# Patient Record
Sex: FEMALE | Race: WHITE | NOT HISPANIC OR LATINO | Employment: FULL TIME | URBAN - METROPOLITAN AREA
[De-identification: names, ages, dates, MRNs, and addresses within clinical notes are randomized per-mention and may not be internally consistent; named-entity substitution may affect disease eponyms.]

---

## 2020-07-29 ENCOUNTER — ALLIED HEALTH/NURSE VISIT (OUTPATIENT)
Dept: SURGERY | Facility: CLINIC | Age: 25
End: 2020-07-29

## 2020-07-29 ENCOUNTER — VIRTUAL VISIT (OUTPATIENT)
Dept: SURGERY | Facility: CLINIC | Age: 25
End: 2020-07-29
Payer: COMMERCIAL

## 2020-07-29 VITALS — BODY MASS INDEX: 33.6 KG/M2 | WEIGHT: 240 LBS | HEIGHT: 71 IN

## 2020-07-29 DIAGNOSIS — E66.811 CLASS 1 OBESITY DUE TO EXCESS CALORIES WITH SERIOUS COMORBIDITY AND BODY MASS INDEX (BMI) OF 33.0 TO 33.9 IN ADULT: Primary | ICD-10-CM

## 2020-07-29 DIAGNOSIS — M25.561 CHRONIC PAIN OF RIGHT KNEE: ICD-10-CM

## 2020-07-29 DIAGNOSIS — G89.29 CHRONIC PAIN OF RIGHT KNEE: ICD-10-CM

## 2020-07-29 DIAGNOSIS — M54.50 LUMBAR BACK PAIN: ICD-10-CM

## 2020-07-29 DIAGNOSIS — E66.09 CLASS 1 OBESITY DUE TO EXCESS CALORIES WITH SERIOUS COMORBIDITY AND BODY MASS INDEX (BMI) OF 33.0 TO 33.9 IN ADULT: Primary | ICD-10-CM

## 2020-07-29 DIAGNOSIS — E66.9 OBESITY (BMI 30-39.9): Primary | ICD-10-CM

## 2020-07-29 PROCEDURE — 99207 ZZC MEDICAL WEIGHT MANAGEMENT MAINTENANCE: CPT

## 2020-07-29 PROCEDURE — 99203 OFFICE O/P NEW LOW 30 MIN: CPT | Mod: 95 | Performed by: PHYSICIAN ASSISTANT

## 2020-07-29 ASSESSMENT — MIFFLIN-ST. JEOR: SCORE: 1929.76

## 2020-07-29 NOTE — PROGRESS NOTES
"Adelso Portillo is a 25 year old female who is being evaluated via a billable video visit.      The patient has been notified of following:     \"This video visit will be conducted via a call between you and your physician/provider. We have found that certain health care needs can be provided without the need for an in-person physical exam.  This service lets us provide the care you need with a video conversation.  If a prescription is necessary we can send it directly to your pharmacy.  If lab work is needed we can place an order for that and you can then stop by our lab to have the test done at a later time.    Video visits are billed at different rates depending on your insurance coverage.  Please reach out to your insurance provider with any questions.    If during the course of the call the physician/provider feels a video visit is not appropriate, you will not be charged for this service.\"    Patient has given verbal consent for Video visit? Yes  How would you like to obtain your AVS? MyChart  If you are dropped from the video visit, the video invite should be resent to: Text to cell phone: [phone]  Will anyone else be joining your video visit? No          Video-Visit Details    Type of service:  Video Visit    Video Start Time: 11:11  Video End Time: 11:47    Originating Location (pt. Location): Home    Distant Location (provider location):  [facility]     Platform used for Video Visit: [employer] Medical Weight Management Consult    PATIENT:  Adelso Portillo  MRN:         [id number]  :         1995  SUNSHINE:         2020    Dear To Whom it May Concern,     I had the pleasure of seeing your patient, Adelso Portillo. Full intake/assessment was done to determine barriers to weight loss success and develop a treatment plan. Adelso Portillo is a 25 year old female interested in treatment of medical problems associated with excess weight. She has a height of " "5' 11\"[Pt reported[, a weight of 240 lbs 0 oz, and the calculated Body mass index is 33.47 kg/m .      ASSESSMENT/PLAN:  About 5 years ago after college noticed a significant weight gain.  There has been slow weight loss and she is down about 25 lbs.  She was working around food all day but switched jobs and feels this has been helpful with her weight loss.  Has some depression and recently started seeing a therapist.  Is interested in starting the 24 week program.  She is not interested in weight loss medications today as sh wants to work on lifestyle first.  She might change her mind later. Recent labs were reviewed.        Goal:  Start 24 week program   Twice weekly will walk for at least 20 minutes      She has the following co-morbidities:       7/24/2020   I have the following health issues associated with obesity: None of the above   I have the following symptoms associated with obesity: Knee Pain, Depression, Back Pain       Patient Goals 7/24/2020   I am interested in having a healthier weight to diminish current health problems: Yes   I am interested in having a healthier weight in order to prevent future health problems: Yes   I am interested in having a healthier weight in order to have a future surgery: No       Referring Provider 7/24/2020   Please name the provider who referred you to Medical Weight Management.  If you do not know, please answer: \"I Don't Know\". I do not know       Weight History 7/24/2020   How concerned are you about your weight? Somewhat Concerned   Would you describe your weight gain as gradual? Yes   I became overweight: As a Teenager   The following factors have contributed to my weight gain:  Mental Health Issues, Lack of Exercise, Genetic (Runs in the Family)   I have tried the following methods to lose weight: Watching Portions or Calories   My lowest weight since age 18 was: 230   My highest weight since age 18 was: 275   The most weight I have ever lost was: (lbs) 30   I " have the following family history of obesity/being overweight:  Many of my relatives are overweight   Has anyone in your family had weight loss surgery? No   How has your weight changed over the last year?  Lost   How many pounds? 1 pound or so     Works 3 pm - midnight      Diet Recall Review with Patient 7/24/2020   Do you typically eat breakfast? Yes    10-10:30 after getting up around 9:30 am   If you do eat breakfast, what types of food do you eat? Mainly oatmeal or something light if i do not really feel hungry. If I have time or are hungry I'll make eggs with veggies and eat an english muffin.  Today did not feel hungry and did not eat. This does not happen much   Do you typically eat lunch? Yes around 1-2 pm   If you do eat lunch, what types of food do you typically eat?  Yesterday had a spicy chicken sandwich from [employer]. Leftovers, sandwiches, it depends.   Do you typically eat supper? Yes  Typically around 6 pm. Yesterday had baked potato with broccoli and cheese. The other night had pasta with marinara and cheese   If you do eat supper, what types of food do you typically eat? Soups, baked fish or chicken, vegetables   Do you typically eat snacks? Yes   If you do snack, what types of food do you typically eat? Chocolate, nuts, fruit, sometimes crackers   Do you like vegetables?  Yes   Do you drink water? Yes    Drinks around 48 oz   How many glasses of juice do you drink in a typical day? 0   How many of glasses of milk do you drink in a typical day? 0   If you do drink milk, what type? N/A   How many 8oz glasses of sugar containing drinks such as Stu-Aid/sweet tea do you drink in a day? 2         Coffee from [employer] or tea with honey   How many cans/bottles of sugar pop/soda/tea/sports drinks do you drink in a day? 0   How many cans/bottles of diet pop/soda/tea or sports drink do you drink in a day? 0   How often do you have a drink of alcohol? 2-4 Times a Month   If you do drink, how many drinks might  you have in a day? 1 or 2       Eating Habits 7/24/2020   Generally, my meals include foods like these: bread, pasta, rice, potatoes, corn, crackers, sweet dessert, pop, or juice. A Few Times a Week   Generally, my meals include foods like these: fried meats, brats, burgers, french fries, pizza, cheese, chips, or ice cream. Once a Week   Eat fast food (like [employer], [employer], [employer]aco [employer]). Never   Eat at a buffet or sit-down restaurant. Never   Eat most of my meals in front of the TV or computer. A Few Times a Week   Often skip meals, eat at random times, have no regular eating times. A Few Times a Week   Rarely sit down for a meal but snack or graze throughout.  A Few Times a Week   Eat extra snacks between meals. Almost Everyday   Eat most of my food at the end of the day. Less Than Weekly   Eat in the middle of the night or wake up at night to eat. Once a Week only because she is up late   Eat extra snacks to prevent or correct low blood sugar. Never   Eat to prevent acid reflux or stomach pain. Once a Week   Worry about not having enough food to eat. Never   Have you been to the food shelf at least a few times this year? No   I eat when I am depressed. Once a Week   I eat when I am stressed. A Few Times a Week   I eat when I am bored. A Few Times a Week   I eat when I am anxious. Once a Week   I eat when I am happy or as a reward. A Few Times a Week   I feel hungry all the time even if I just have eaten. Never   I finish all the food on my plate even if I am already full. A Few Times a Week   I can't resist eating delicious food or walk past the good food/smell. Less Than Weekly   I eat/snack without noticing that I am eating. Once a Week   I eat when I am preparing the meal. A Few Times a Week   I eat more than usual when I see others eating. Never   I have trouble not eating sweets, ice cream, cookies, or chips if they are around the house. A Few Times a Week   I think about food all day. Never   What  foods, if any, do you crave? Sweets/Candy/Chocolate   Please list any other foods you crave? Cravings kind of depend. I crave sweets sometimes, and saltier foods other times. Sometimes I try not to buy my specific craving because I know I'll just eat it all in one sitting       Amount of Food 7/24/2020   I make myself vomit what I have eaten or use laxatives to get rid of food. Never   I eat a large amount of food, like a loaf of bread, a box of cookies, a pint/quart of ice cream, all at once. Weekly   This is mainly after grocery shopping   I eat a large amount of food even when I am not hungry. Monthly   I eat rapidly. Almost Everyday   I eat alone because I feel embarrassed and do not want others to see how much I have eaten. Never   I eat until I am uncomfortably full. Monthly   I feel bad, disgusted, or guilty after I overeat. Weekly   I make myself vomit what I have eaten or use laxatives to get rid of food. Never       Activity/Exercise History 7/24/2020   How much of a typical 12 hour day do you spend sitting? Most of the Day   How much of a typical 12 hour day do you spend lying down? Less Than Half the Day   How much of a typical day do you spend walking/standing? Half the Day   How many hours (not including work) do you spend on the TV/Video Games/Computer/Tablet/Phone? 6 Hours or More   How many times a week are you active for the purpose of exercise? Once a Week   What keeps you from being more active? Lack of Time, Too tired, Worried People Will Look At Me   How many total minutes do you spend doing some activity for the purpose of exercising when you exercise? 15-30 Minutes       PAST MEDICAL HISTORY:  Past Medical History:   Diagnosis Date     Depressive disorder        Work/Social History Reviewed With Patient 7/24/2020   My employment status is: Full-Time   My job is: Evening shift MLT at [employer]   How much of your job is spent on the computer or phone? 75%   How many hours do  you spend commuting to work daily?  0.5 hours   What is your marital status? Single   If in a relationship, is your significant other overweight? N/A   Do you have children? No   If you have children, are they overweight? N/A   Who do you live with?  One of my sisters, she is 2 years younger and does not know about my health goals.   Are they supportive of your health goals? No   Who does the food shopping?  Me       Mental Health History Reviewed With Patient 7/24/2020   Have you ever been physically or sexually abused? No   If yes, do you feel that the abuse is affecting your weight? N/A   If yes, would you like to talk to a counselor about the abuse? N/A   How often in the past 2 weeks have you felt little interest or pleasure in doing things? For Several Days   Over the past 2 weeks how often have you felt down, depressed, or hopeless? For Several Days       Sleep History Reviewed With Patient 7/24/2020   How many hours do you sleep at night? 8   Do you think that you snore loudly or has anybody ever heard you snore loudly (louder than talking or so loud it can be heard behind a shut door)? No   Has anyone seen or heard you stop breathing during your sleep? No   Do you often feel tired, fatigued, or sleepy during the day? Yes   Do you have a TV/Computer in your bedroom? Yes       ROS  General  Fatigue: yes  Sleep Quality:ok  HEENT  Hx of glaucoma: No  Vision changes: No  Cardiovascular  Chest Pain with Exertion: No  Palpitations: No  Hx of heart disease: No  Pulmonary  Shortness of breath at rest: No  Shortness of breath with exertion: No  Snoring: No  Gastrointestinal  Heartburn: some   Might take a tums  Abdominal pain: No  Gastrourinary  History of kidney stones: No  Psychiatric  Moods Stable: Yes  History of drug abuse: No  Endocrine  Polydipsia: No  Polyuria: No  Neurologic:  Hx of seizures: No  Hx of paresthesias No  Birth Control:  No      MEDICATIONS:   Current Outpatient Medications   Medication Sig  "Dispense Refill     VITAMIN D PO          ALLERGIES:   No Known Allergies       PHYSICAL EXAM:  Ht 5' 11\" (1.803 m)   Wt 240 lb (108.9 kg)   BMI 33.47 kg/m    General: NAD  Alert and oriented      FOLLOW-UP:   Per 24 week program        Sincerely,    Silvina Troncoso PA-C            "

## 2020-07-29 NOTE — PROGRESS NOTES
RN spoke with pt on the phone following provider visit at which patient signed up for 24 Week Plan and declined [employer] product line.    Discussed with patient the 24 Week Healthy Lifestyle Program, including cost/payment, cooking classes and exercise classes, schedule of visits, journal and healthy habits.    Handouts and cookbook/journal will be mailed to pt.    Patient graduation date is 1/12/2021.    Reviewed contact information for questions or concerns.    Patient will call back to make her appointments.    Phone number for payment given to pt.    Gypsy Matthews RN on 7/29/2020 at 12:10 PM

## 2020-07-29 NOTE — PATIENT INSTRUCTIONS
Goal:  Start 24 week program   Twice weekly will walk for at least 20 minutes        Eat Better ? Move More ? Live Well    Eat 3 nutrient-rich meals each day     Don t skip meals--it will cause you to overeat later in the day!     Eating fiber (vegetables/fruits/whole grains) and protein with meals helps you stay full longer     Choose foods with less than 10 grams of sugar and 5 grams of fat per serving to prevent excess calories and weight re-gain   Eat around the same times each day to develop a routine eating schedule    Avoid snacking unless physically hungry.   Planned snacks: 1-2 times per day and no more than 150 calories    Eat protein first    Protein helps with healing, maintaining adequate muscle mass, reducing hunger and optimizing nutritional status    Aim for 60-80 grams of protein per day   Fill up on Fiber    Fiber comes from plants--fruits, veggies, whole grains, nuts/seeds and beans    Fiber is low in calories, high in phytonutrients and helps you stay full longer    Aim for 25-35 grams per day by eating fiber with meals and snacks  Eat [id number]    Take 20-30 minutes to eat each meal by taking small bites, chewing foods to applesauce consistency or 20-30 times before you swallow    Eating foods too fast can delay satiety/fullness signals and increase overeating   ? Slow down your eating by using toddler utensils, putting your fork/spoon down between bites and not watching TV or emailing during meals!   Keep a Journal          Writing down what you eat, how you feel and when you are active helps you identify new changes to work on from week to week          Look for ways to cut 100 calories from your current diet 2-3 times per day  Drink 64 ounces of 0-Calorie drinks between meals    Water    Zero calorie [employer]  or Vitamin Water      [employer]  Zero Calories    Crystal Light , Sugar-Free Stu-Aid , and other sugar-free lemonade or flavored braswell    Keep Caffeine to less than 300mg per day  ie: 3-6oz cups coffee     Work up to 45-60 minutes of physical activity most days of the week    Helps with losing weight and prevent regaining those extra pounds!     Do a combo of cardio (walking/water exercises) and strength training (lifting weights/Vinyasa yoga)    Avoid Mindless Eating    Be present when you eat--take note of the smell, taste and quality of your food    Make a list of alternative activities you could do to prevent eating out of boredom/stress  ? Go for a walk, call a friend, chew gum, paint your nails, re-organize the garage, etc

## 2020-07-30 ENCOUNTER — ALLIED HEALTH/NURSE VISIT (OUTPATIENT)
Dept: SURGERY | Facility: CLINIC | Age: 25
End: 2020-07-30

## 2020-07-30 DIAGNOSIS — E66.9 OBESITY (BMI 30-39.9): Primary | ICD-10-CM

## 2020-07-30 PROCEDURE — 99207: CPT

## 2020-07-30 NOTE — PROGRESS NOTES
Encounter placed to collect payment for 24 Week HLP plan.  Pt was not seen in clinic.  Gypsy Matthews RN on 7/30/2020 at 3:21 PM

## 2020-07-30 NOTE — PROGRESS NOTES
Patient handouts, cookbook, and journal mailed to patient.  Awaiting payment call.  Gypsy Matthews RN on 7/30/2020 at 2:21 PM

## 2020-08-03 ENCOUNTER — VIRTUAL VISIT (OUTPATIENT)
Dept: SURGERY | Facility: CLINIC | Age: 25
End: 2020-08-03
Payer: COMMERCIAL

## 2020-08-03 DIAGNOSIS — E66.9 OBESE: Primary | ICD-10-CM

## 2020-08-03 DIAGNOSIS — E66.09 CLASS 1 OBESITY DUE TO EXCESS CALORIES WITH SERIOUS COMORBIDITY AND BODY MASS INDEX (BMI) OF 33.0 TO 33.9 IN ADULT: ICD-10-CM

## 2020-08-03 DIAGNOSIS — E66.811 CLASS 1 OBESITY DUE TO EXCESS CALORIES WITH SERIOUS COMORBIDITY AND BODY MASS INDEX (BMI) OF 33.0 TO 33.9 IN ADULT: ICD-10-CM

## 2020-08-03 PROCEDURE — 97802 MEDICAL NUTRITION INDIV IN: CPT | Mod: GT | Performed by: DIETITIAN, REGISTERED

## 2020-08-03 PROCEDURE — 99207 ZZC MEDICAL WEIGHT MANAGEMENT MAINTENANCE: CPT | Performed by: COMMUNITY HEALTH WORKER

## 2020-08-03 NOTE — PROGRESS NOTES
"[facility]    Progress Notes    New Weight Management Health Coaching Note    Adelso Portillo          MRN: [id number]  : 1995  SUNSHINE: August 3, 2020    Health  Visit #: 1  Visit Type:  24 week   Telephone Visit - Call Mobile: [phone] - listed as her home also mobile?     Initial Start Date: 2020  Graduation Date: 2020     Provider: Silvina Troncoso, Obesity Class 1 (BMI 30 to 34.9).  Initial Height and Wt: 5' 11 , 240 lbs   BMI = 33.47 kg/m     Chart Review Notes.  Wt Loss Prior to Starting Program: 25 lbs  Adult Wt Range: 230 to 275, Most wt loss: 30 lb; Wt change in last year: Lost 1 lb  Became Overwt as 5 yrs after college ()  Other Pillar Notes: Knee Pain, Depression (seeing therapist), Back Pain   [place] about program?     Provider PLAN.    \"Start 24 week program   Twice weekly will walk for at least 20 minutes\"     ASSESSMENT.  Current Weight: 240 lbs still -any weigh-ins since ?  Average Daily Water: 48 oz + [place] Coffee or tea w/honey  1-2 alcohol drinks 2-4 x/month  Weight Loss Medication: None at this time  Nutrition Plan: Other: Regular Diet    Additional Program Support.  Payment: SHIRLENE mayer, awaiting as of   Pillar Assessment Completed on: Initial 8/3/20, Mid-Program, #12 Visit  Estore: n/a  Received cookbook on - Patient handouts, cookbook, and journal mailed on 20  W2W Discussions: 8/3 + fabiano sent  Cooking Class Series: f/u on   EM Class:   Support Group: ?    INITIAL INTAKE:  The four pillars of well-being may impact your ability to manage weight.    Initial Level of Satisfaction completed on 8/3: All rated on a scale of 1-10.    Sleep: 6    Movement: 3    Nutrition: 6    Stress Management/Emotional Well-bein    Sleep: Would like to be at a 9  Movement: Would like to be at a 7  Nutrition: Would like to be at a 9  Stress Management/Emotional Wellbeing: Would like to be at a 8-9    Which of the pillars are you wanting to focus on " "first and why?    Adelso's initial focus is on sleep, nutrition & movement  pillars.  Sleep: Establishing a more consistent bedtime. Her position is strictly evenings which means she works 3 pm to midnight (with only random overnights) when team needs so no barriers presented today on establishing this.  Movement: f/u with at future visits  Nutrition: Per RD recommendations so far, Eating breakfast within an hour of waking up and increasing consistency with meal planning and prepping.  She feels confident in creating healthy meals aligned with My[employer] Method yet open to adding to her tools her. Limited to for discussion here. Possible adding in new recipes to prevent boredom?    Initial RD Goals set on 8/3 with Syl. Directly prior to her HC visit today.    Action Steps at Today's HC Visit.  (Both below are per her RD visit recommendations on 8/3.)  1) Improve Breakfast, including protein and fiber in breakfast meal & aim to eat within an hour after waking. E.g adding nuts to her oatmeal + honey. (Currently eating 2+ hrs after waking up and only will have protein or fiber.)  2) Plan and prep in advance for lunch and dinner to reduce convenient meals.   3) If time allows check MyChart on Program Info + W2W classes    See above for \"beginning\" of creating her Wellness Intentions.    Next Visit: F/u with meal planning & prepping discussion - SG + W2W Classes - [place] about program?   Future: Co-creating Wellness Growth Plan. Journaling -Tracking - Allocated Reflection Time     PATIENT INFORMATION PROVIDED:  - 24 Week Healthy Lifestyle Plan Overview Handout:  o Explained the structure of the 24 week plan  o Reviewed scheduling information  o Discussed option to do coaching sessions via phone or in person  - Cooking and Exercise Class Handout  - Support Group  - Explain the role of a HEALTH  and the FOUR Pillars of Health  - Contact Info (HC & Program), W2W Class Dates + Scale Info  - Explained MyChart " Process  - Discussed 'Wellness Plan' Concept  - [place] Discussion & Setup - n/a  - Reviewed program and made an action plan for pt to complete all his HC visits within 24 wks.     HC Visit #1 8/3, Check-in Program F/u on , #2 8/10, #3     Confirm Other 24 Wk HLP Visits. No f/u Rd or Provider: Pt will schedule.     NOTES: Adelso is 24 yo. 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 Lives w/sis 2 yrs younger than her - hasn't shared health intentions w/her yet.  - 1995    FOLLOW-UP: Scheduled in office, reminder to switch to phone or reschedule by calling [phone].     TIME: 30 minutes spent with patient, including charting time.     SIGNATURE:  Caterina Barlow, Certified Health  on 8/3/2020 at 7:45 AM

## 2020-08-03 NOTE — PROGRESS NOTES
"Adelso Portillo is a 25 year old female who is being evaluated via a billable video visit.      The patient has been notified of following:     \"This video visit will be conducted via a call between you and your physician/provider. We have found that certain health care needs can be provided without the need for an in-person physical exam.  This service lets us provide the care you need with a video conversation.  If a prescription is necessary we can send it directly to your pharmacy.  If lab work is needed we can place an order for that and you can then stop by our lab to have the test done at a later time.    Video visits are billed at different rates depending on your insurance coverage.  Please reach out to your insurance provider with any questions.    If during the course of the call the physician/provider feels a video visit is not appropriate, you will not be charged for this service.\"    Patient has given verbal consent for Video visit? Yes  How would you like to obtain your AVS? MyChart  Will anyone else be joining your video visit? No        Video-Visit Details    Type of service:  Video Visit    Video Start Time: 10:00am  Video End Time: 10:30am    Originating Location (pt. Location): Home    Distant Location (provider location):  Provider Remote Office    Platform used for Video Visit: [facility] WEIGHT LOSS INITIAL EVALUATION - 24w Healthy Lifestyle Program  DIAGNOSIS:  Obesity Class I    NUTRITION HISTORY:  Diet recall per PA-C visit on 7/29/2020 as follows:  Diet Recall Review with Patient 7/24/2020   Do you typically eat breakfast? Yes    10-10:30 after getting up around 9:30 am   If you do eat breakfast, what types of food do you eat? Mainly oatmeal or something light if i do not really feel hungry. If I have time or are hungry I'll make eggs with veggies and eat an english muffin.  Today did not feel hungry and did not eat. This does not happen much   Do you typically eat lunch? Yes around " "1-2 pm   If you do eat lunch, what types of food do you typically eat?  Yesterday had a spicy chicken sandwich from [employer]. Leftovers, sandwiches, it depends.   Do you typically eat supper? Yes  Typically around 6 pm. Yesterday had baked potato with broccoli and cheese. The other night had pasta with marinara and cheese   If you do eat supper, what types of food do you typically eat? Soups, baked fish or chicken, vegetables   Do you typically eat snacks? Yes   If you do snack, what types of food do you typically eat? Chocolate, nuts, fruit, sometimes crackers   Do you like vegetables?  Yes   Do you drink water? Yes    Drinks around 48 oz   How many glasses of juice do you drink in a typical day? 0   How many of glasses of milk do you drink in a typical day? 0   If you do drink milk, what type? N/A   How many 8oz glasses of sugar containing drinks such as St[employer]/sweet tea do you drink in a day? 2         Coffee from [employer] or tea with honey   How many cans/bottles of sugar pop/soda/tea/sports drinks do you drink in a day? 0   How many cans/bottles of diet pop/soda/tea or sports drink do you drink in a day? 0   How often do you have a drink of alcohol? 2-4 Times a Month   If you do drink, how many drinks might you have in a day? 1 or 2     Other: Pt starting 24w program. Opts to work on lifestyle changes before starting medications or using meal replacement product.     ANTHROPOMETRICS:  Height: 5' 11\"   Weight: 240 lbs 0 oz   BMI:  Body mass index is 33.47 kg/m .   NUTRITION DIAGNOSIS:   Obese, class I related to excess energy intake as evidence by BMI of 33.46 kg/m2.   NUTRITION INTERVENTIONS  Nutrition Prescription:  Recommend modified energy- nutrient intake  Implementation:  Nutrition Education (Content):    Discussed portion sizes and plate method    Provided handouts: Tips for Weight Loss and Weight Management, Protein Sources for Weight Loss, Tips for Increasing Fiber, Plate Method    Nutrition Education " (Application):     Patient to practice goals as stated below    Patient verbalizes understanding of diet by stating she will work on eating multiple food groups at meals    Anticipate good compliance    Goals:  Include 2 food groups at breakfast  Plan lunches and dinners ahead of time    FOLLOW UP AND MONITORING:    Other  - follow up per 24w [facility] protocol    Visit Length:   30 minutes     Syl Villavicencio RD, LD  Clinical Dietitian

## 2020-08-04 VITALS — WEIGHT: 240 LBS | HEIGHT: 71 IN | BODY MASS INDEX: 33.6 KG/M2

## 2020-08-04 ASSESSMENT — MIFFLIN-ST. JEOR: SCORE: 1929.76

## 2020-08-07 ENCOUNTER — VIRTUAL VISIT (OUTPATIENT)
Dept: SURGERY | Facility: CLINIC | Age: 25
End: 2020-08-07
Payer: COMMERCIAL

## 2020-08-07 DIAGNOSIS — E66.9 OBESE: Primary | ICD-10-CM

## 2020-08-07 PROCEDURE — 99207 ZZC MWM HEALTH COACH NO CHARGE: CPT | Performed by: COMMUNITY HEALTH WORKER

## 2020-08-07 NOTE — PROGRESS NOTES
"FREDDY SWAN     Progress Notes    Return Weight Management Health Coaching Note    Adelso Portillo          MRN: [id number]  : 1995  SUNSHINE: 2020    Health  Visit #: 2  Type of Visit: 24 week   Telephone Visit - Call Mobile: [phone] - listed as her home also mobile?     Initial Start Date: 2020  Graduation Date: 2020     Provider: Silvina Troncoso, Obesity Class 1 (BMI 30 to 34.9).  Initial Height and Wt: 5' 11 , 240 lbs   BMI = 33.47 kg/m     ASSESSMENT:  HC Visit on 8/3:      Current Weight: none reported  Average Daily Water: 48 oz + [place] Coffee or tea w/honey  1-2 alcohol drinks 2-4 x/month  Weight Loss Medication: None  Nutrition Plan: Other: Regular Diet  Cooking Class: No  Exercise Class: No    Additional Program Support.  DPP Program: ?  Payment: SHIRLENE mayer, awaiting as of   Pillar Assessment Completed on: Initial 8/3/20, Mid-Program, #12 Visit  Estore: n/a  Received cookbook on - Patient handouts, cookbook, and journal mailed on 20  W2W Discussions: 8/3 + mycharts sent,    Cooking Class Series: F/uon 8/10 - ready to set reg goal?    EM Class:   Support Group:  discussed - interest tbd    Reviewed Previous RD Goals from 8/3.  \"Include 2 food groups at breakfast  Plan lunches and dinners ahead of time\"    Goals Set at HC Visit on 8/3. Reviewed w/updates reflected in goals below.     Pillars/Intentions Discussed & Action Steps Set at Today's HC Visit.   Highlights from Past Week's and Today's Visit:  *Depsite her schedule change (workign an overnight shift), Adelso accomplished eating her breakfast 3x during within an hour after waking up.   *Doing great w/other nutrition intentions: making protein/fiber changes with her breakfast & meal prepping in advance. Soup on the stove & roasting larger amounts of meats/veggies have proven helpful for her.   *Already brainstorming what would serve her best with nutrition pillar: keeping a  " "kitchen by not bringing home trigger foods (telling herself \"no\" @ grocery store) and for ice cream purchasing only a small container.     Action Steps pt set until next HC Visit are:  1) Nutrition.  -Improve Breakfast, including protein and fiber in breakfast meal & aim to eat within an hour after waking. E.g adding nuts to her oatmeal + honey. (Currently eating 2+ hrs after waking up and only will have protein or fiber.)  -Meal prepping in advance. Adelso wants to continue to have 2 parts of her meals ready in advance (e.g. protein and veggies).  -Check/try out protein-fortified ice cream options out. (She wants to try this over freezing greek yogurt.)    2)  Schedule next RD visit.    3) Consider when she will want to register for W2W classes & interest in Healthy Lifestyle Support Group.    Next Visit: Capture weigh-in, f/u with #3 above + discussion on Sleep.-Establishing bedtime routine.     NOTES:  - 1995    Wellness Intentions.   Sleep.  -Establishing a more consistent bedtime. Her position is strictly evenings which means she works 3 pm to midnight (with only random overnights) when team needs so no barriers presented today on establishing this.    Nutrition.  -Improve quality of dietary intake. (protein & fiber intake)  -Improve meal timing, specifically eating breakfast earlier. -Plan and prep in advance for lunch and dinner to reduce convenient meals.   3) If time allows check MyChart on Program Info + W2W classes     See above for \"beginning\" of creating her Wellness Intentions.    PATIENT EDUCATION PROVIDED:  Progress Review  OEQ -Lessons [place]/Success Stories to Share   Affirmations / Character Strength Reflection  OEQ -RD, W2W classes, program and scheduling questions,   OEQ - Anything more pt needs to add addt'l value to her visit today?    HC Visit #1 8/3, Check-in Program F/u on , #2 8/10, #3     Confirmed Upcoming 24 Wk HLP provider/rd visits: No f/u Rd or Provider: Pt will " schedule.     FOLLOW-UP: Reminder to switch to phone or reschedule by calling [phone].    TIME: 30 minutes spent with patient, including charting time.   SIGNATURE:  Caterina Barlow, Certified Health  on 8/7/2020 at 10:14 AM

## 2020-08-10 ENCOUNTER — VIRTUAL VISIT (OUTPATIENT)
Dept: SURGERY | Facility: CLINIC | Age: 25
End: 2020-08-10
Payer: COMMERCIAL

## 2020-08-10 DIAGNOSIS — E66.9 OBESE: Primary | ICD-10-CM

## 2020-08-10 PROCEDURE — 99207 ZZC MWM HEALTH COACH NO CHARGE: CPT | Performed by: COMMUNITY HEALTH WORKER

## 2020-08-10 NOTE — PROGRESS NOTES
"FREDDY SWAN     Progress Notes    Return Weight Management Health Coaching Note    Adelso Portillo          MRN: [id number]  : 1995  SUNSHINE: August 10, 2020    Health  Visit #: 3 & 4  Type of Visit: 24 week   Telephone Visit - Call Mobile: [phone] - listed as her home also mobile?     Initial Start Date: 2020  Graduation Date: 2020      Provider: Silvina Troncoso, Obesity Class 1 (BMI 30 to 34.9).  Initial Height and Wt: 5' 11 , 240 lbs   BMI = 33.47 kg/m     ASSESSMENT:  HC Visit on 8/3: 240 lbs (self-reported)    Current Weight: none reported  Average Daily Water: 48 oz + [place] Coffee or tea w/honey  1-2 alcohol drinks 2-4 x/month  Weight Loss Medication: None  Nutrition Plan: Other: regular diet  Cooking Class: No  Exercise Class: No    Additional Program Support.  DPP Program: ?  Payment: SHIRLENE mayer, awaiting as of   Pillar Assessment Completed on: Initial 8/3/20, Mid-Program, #12 Visit  Estore: n/a  Received cookbook on - Patient handouts, cookbook, and journal mailed on 20  W2W Discussions: 8/3 + mycharts sent,    Cooking Class Series: F/u on 8/10 - ready to set reg goal?  EM Class:   Support Group:  discussed - interest tbd    Reviewed Previous RD Goals from 8/3.  \"Include 2 food groups at breakfast  Plan lunches and dinners ahead of time\"    Goals Set at HC Visit on . Reviewed w/updates reflected in goals below.     Pillars/Intentions Discussed & Action Steps Set at Today's HC Visit.   Highlights from Past Week's and Today's Visit:  *Awareness gained: Walking outside with a mask doesn't feel doable for her. She understands she can seek her PCP guidance about mask wearing outside yet at this time she prefers to add in movement that she wont' \"talk herself out of\".   *Her meal prepping & planning strategies continue to be doable and successful. Prepped rice, ground beef and cut up veggies for ease to put together lunches that meet her nutritional " intentions.     Action Steps pt set until next HC Visit are:  1) Nutrition.  -Improve Breakfast, including protein and fiber in breakfast meal & aim to eat within an hour after waking. E.g adding nuts to her oatmeal + honey. (Currently eating 2+ hrs after waking up and only will have protein or fiber.)  -Meal prepping in advance. Adelso wants to continue to have 2 parts of her meals ready in advance (e.g. protein and veggies).  -Check/try out protein-fortified ice cream options out. (She wants to try this over freezing greek yogurt.)  -Keeping her meal prep simple & adding adding in the variety she needs = important to her.     2) Movement.   -Continue with 10-15 minute walks on her 2nd break.   -On her days off (like today & weekend ahead), she wants to add in yoga. She is curious about checking out the following youtube channel: Yoga w/[employer] & [employer]ya [employer].   -Take the Interview is another channel she wants to have as a back up option to explore for variety in the future.       3) Consider when she will want to register for W2W classes & interest in Healthy Lifestyle Support Group.    4) Schedule next RD visit.    Next Visit: capture weigh-in & water intake -  f/u with #3 above + discussion on Sleep.-Establishing bedtime routine.     Wellness Intentions.   Sleep.  -Establishing a more consistent bedtime. Her position is strictly evenings which means she works 3 pm to midnight (with only random overnights) when team needs so no barriers presented today on establishing this.    Nutrition.  -Improve quality of dietary intake. (protein & fiber intake)  -Improve meal timing, specifically eating breakfast earlier. -Plan and prep in advance for lunch and dinner to reduce convenient meals.     Movement.  -Intentions to increase & create a balance regimen.     PATIENT EDUCATION PROVIDED:  Progress Review  OEQ -Lessons [place]/Success Stories to Share   Affirmations / Character Strength Reflection  OEQ - Nutrition intentions, meal  prepping and planning strategies, w2w classes, sg, movement history +  intentions  OEQ - Anything more pt needs to add addt'l value to her visit today?    HC Visit #1 8/3, Check-in Program F/u on 8/7, #2 8/10, #3 8/17, #4 8/31, #6 9/9     Confirmed Upcoming 24 Wk HLP provider/rd visits: No f/u Rd or Provider: Pt will schedule.     FOLLOW-UP: Reminder to switch to phone or reschedule by calling [phone].    TIME: 30 minutes spent with patient, including charting time.     SIGNATURE:  Caterina Barlow, Certified Health  on 8/10/2020 at 10:42 AM

## 2020-08-17 ENCOUNTER — VIRTUAL VISIT (OUTPATIENT)
Dept: SURGERY | Facility: CLINIC | Age: 25
End: 2020-08-17
Payer: COMMERCIAL

## 2020-08-17 DIAGNOSIS — E66.9 OBESE: Primary | ICD-10-CM

## 2020-08-17 PROCEDURE — 99207 ZZC MWM HEALTH COACH NO CHARGE: CPT | Performed by: COMMUNITY HEALTH WORKER

## 2020-08-17 NOTE — PROGRESS NOTES
"FREDDY SWAN     Progress Notes    Return Weight Management Health Coaching Note    Adelso Portillo          MRN: [id number]  : 1995  SUNSHINE: 2020    Health  Visit #: 5  Type of Visit: 24 week   Telephone Visit - Call Mobile: [phone] - listed as her home also mobile?     Initial Start Date: 2020  Graduation Date: 2020      Provider: Silvina Troncoso, Obesity Class 1 (BMI 30 to 34.9).  Initial Height and Wt: 5' 11 , 240 lbs   BMI = 33.47 kg/m     ASSESSMENT:  HC Visit on 8/3: 240 lbs (self-reported)     Current Weight (lbs): 236 lbs (self-reported) -weigh-in wkly on random days   Average Daily Water (ounces): 48 oz + [place] Coffee or tea w/honey  1-2 alcohol drinks 2-4 x/month  Weight Loss Medication: None  Nutrition Plan: Other: Regular Diet  Cooking Class: No  Exercise Class: No    Additional Program Support.  DPP Program: ?  Payment: SHIRLENE mayer, awaiting as of   Pillar Assessment Completed on: Initial 8/3/20, Mid-Program, #12 Visit  Estore: n/a  Received cookbook on - Patient handouts, cookbook, and journal mailed on 20  W2W Discussions: 8/3 + mycharts sent, , 8/10,   Cooking Class Series: Goal to reg  -plans to do EM class first & enrolling in multiple sessions for both since she works nights - she may ask if they have any day time options that they can \"substitute\".   EM Class:   Support Group:  declining at this time    Reviewed Previous RD Goals from 8/3.  \"Include 2 food groups at breakfast  Plan lunches and dinners ahead of time\"    Goals Set at HC Visit on 8/10. Reviewed w/updates reflected in goals below.     Pillars/Intentions Discussed & Action Steps Set at Today's HC Visit.   Highlights from Past Week's and Today's Visit:  *Tried a new mode of activity: yoga w/ben on youtube - a routine for her lower back. Found it enjoyable, effective & doable to fit into her routine.   *Adelso is feeling great with her progress in the " nutrition & movement pillars. Ready to add to the later...  *Relecting on her movement pillar, she has increased it from random movement (walks) to 4-5 x/wk)    Action Steps pt set until next HC Visit are:  1) Nutrition.  -Improve Breakfast, including protein and fiber in breakfast meal & aim to eat within an hour after waking. E.g adding nuts to her oatmeal + honey. (Currently eating 2+ hrs after waking up and only will have protein or fiber.)  -Meal prepping in advance. Adelso wants to continue to have 2 parts of her meals ready in advance (e.g. protein and veggies).  -Check/try out protein-fortified ice cream options out. (She wants to try this over freezing greek yogurt.)    2) Movement.   -Walking 3-4 x/wk for 10-15 minute walks on her 2nd break.   -1 Yoga routine per wk. (Using Yoga w/[employer]  For now yet has other options to explore: [employer], [employer] etc.)     3) Registering for W2W classes today & will schedule her next RD visit - thinking wk of . If she can't find a time available that wk and schedules wk of , she may opt to move her  hc visit.     Next Visit: f/u with w2w classes & rd visit, do we need to switch her  visit?     NOTES:  - 1995    Wellness Intentions.   Sleep.  -Establishing a more consistent bedtime. Her position is strictly evenings which means she works 3 pm to midnight (with only random overnights) when team needs so no barriers presented today on establishing this.     Nutrition.  -Improve quality of dietary intake. (protein & fiber intake)  -Improve meal timing, specifically eating breakfast earlier. -Plan and prep in advance for lunch and dinner to reduce convenient meals.     Movement.  -Intentions to increase & create a balance regimen.     PATIENT EDUCATION PROVIDED:  Progress Review  OEQ -Lessons [place]/Success Stories to Share   Affirmations / Character Strength Reflection  OEQ - Nutrion & Movement Intentions, Progress of her movement since  "starting program, weigh-in \"schedule\", water intake, w2w & sg  OEQ - Anything more pt needs to add addt'l value to her visit today?    HC Visit #1 8/3, Check-in Program F/u on 8/7, #2 8/10, #3 8/17, #4 8/31, #5 9/9, # 6 9/21    Confirmed Upcoming 24 Wk HLP provider/rd visits: 8/17 plans to schedule next rd visit wk of 9/14 or 9/21    FOLLOW-UP: Reminder to switch to phone or reschedule by calling [phone].    TIME: 30 minutes spent with patient, including charting time.       SIGNATURE:  Caterina Barlow, Certified Health  on 8/17/2020 at 10:36 AM  "

## 2020-08-17 NOTE — Clinical Note
"FYI: pt has goal to schedule her next rd visit.    \"...will schedule her next RD visit - thinking wk of 9/14. If she can't find a time available that wk and schedules wk of 9/21, she may opt to move her 9/21 hc visit.\"  "

## 2020-08-31 ENCOUNTER — TELEPHONE (OUTPATIENT)
Dept: SURGERY | Facility: CLINIC | Age: 25
End: 2020-08-31

## 2020-08-31 NOTE — TELEPHONE ENCOUNTER
Pt Missed Scheduled HC Visit.    Left vm for pt regarding missed Health  visit today, providing pt reschedule info and visit options later this week. Also, confirmed pt next hc visit (by chance she simply wants to wait until her next visit to connect).  -GN

## 2020-09-09 ENCOUNTER — TELEPHONE (OUTPATIENT)
Dept: SURGERY | Facility: CLINIC | Age: 25
End: 2020-09-09

## 2020-09-09 NOTE — TELEPHONE ENCOUNTER
Pt Missed Scheduled HC Visit.    Left vm for pt regarding missed Health  visit today. Provided pt reschedule info including my direct office line, mycharting option, and [employer]'s team phone number.  ALEXSANDRA

## 2020-09-21 ENCOUNTER — TELEPHONE (OUTPATIENT)
Dept: SURGERY | Facility: CLINIC | Age: 25
End: 2020-09-21

## 2020-09-21 NOTE — TELEPHONE ENCOUNTER
Pt Missed Scheduled HC Visit.    Left vm for pt regarding missed Health  visit today. Provided pt reschedule info including my direct office line, mycharting option, and [employer]'s team phone number. Left 1 vm & sent mychart since it was pts 3rd missed hc visit. -GN

## 2020-10-28 ENCOUNTER — TELEPHONE (OUTPATIENT)
Dept: SURGERY | Facility: CLINIC | Age: 25
End: 2020-10-28

## 2021-01-04 ENCOUNTER — HEALTH MAINTENANCE LETTER (OUTPATIENT)
Age: 26
End: 2021-01-04

## 2021-10-10 ENCOUNTER — HEALTH MAINTENANCE LETTER (OUTPATIENT)
Age: 26
End: 2021-10-10

## 2021-10-25 ENCOUNTER — APPOINTMENT (OUTPATIENT)
Dept: URGENT CARE | Facility: CLINIC | Age: 26
End: 2021-10-25
Payer: COMMERCIAL

## 2022-01-30 ENCOUNTER — HEALTH MAINTENANCE LETTER (OUTPATIENT)
Age: 27
End: 2022-01-30

## 2022-09-24 ENCOUNTER — HEALTH MAINTENANCE LETTER (OUTPATIENT)
Age: 27
End: 2022-09-24

## 2022-11-29 ENCOUNTER — TRANSCRIBE ORDERS (OUTPATIENT)
Dept: OTHER | Age: 27
End: 2022-11-29

## 2022-11-29 DIAGNOSIS — M77.9 TENDINITIS: Primary | ICD-10-CM

## 2023-08-05 ENCOUNTER — HEALTH MAINTENANCE LETTER (OUTPATIENT)
Age: 28
End: 2023-08-05

## 2024-04-05 ENCOUNTER — ANCILLARY PROCEDURE (OUTPATIENT)
Dept: GENERAL RADIOLOGY | Facility: CLINIC | Age: 29
End: 2024-04-05
Attending: FAMILY MEDICINE
Payer: COMMERCIAL

## 2024-04-05 ENCOUNTER — OFFICE VISIT (OUTPATIENT)
Dept: URGENT CARE | Facility: URGENT CARE | Age: 29
End: 2024-04-05
Payer: COMMERCIAL

## 2024-04-05 VITALS
WEIGHT: 293 LBS | HEART RATE: 65 BPM | OXYGEN SATURATION: 96 % | RESPIRATION RATE: 16 BRPM | DIASTOLIC BLOOD PRESSURE: 72 MMHG | SYSTOLIC BLOOD PRESSURE: 121 MMHG | TEMPERATURE: 97.7 F | BODY MASS INDEX: 41.14 KG/M2

## 2024-04-05 DIAGNOSIS — M25.572 PAIN IN JOINT, ANKLE AND FOOT, LEFT: ICD-10-CM

## 2024-04-05 DIAGNOSIS — M25.572 PAIN IN JOINT, ANKLE AND FOOT, LEFT: Primary | ICD-10-CM

## 2024-04-05 PROCEDURE — 73610 X-RAY EXAM OF ANKLE: CPT | Mod: TC | Performed by: RADIOLOGY

## 2024-04-05 PROCEDURE — 99213 OFFICE O/P EST LOW 20 MIN: CPT | Performed by: FAMILY MEDICINE

## 2024-04-05 RX ORDER — METHYLPHENIDATE HYDROCHLORIDE 18 MG/1
1 TABLET, EXTENDED RELEASE ORAL EVERY MORNING
COMMUNITY
Start: 2024-03-05 | End: 2024-09-30 | Stop reason: ALTCHOICE

## 2024-04-05 RX ORDER — LEVONORGESTREL 19.5 MG/1
INTRAUTERINE DEVICE INTRAUTERINE
COMMUNITY
Start: 2023-07-27

## 2024-04-05 NOTE — PROGRESS NOTES
Assessment & Plan     Pain in joint, ankle and foot, left [M25.572]  X-ray appears unremarkable will be over read by radiology.  I think her problem is an Achilles tendinitis.  I have demonstrated for her stretches talked about wearing heel lift.  And icing the area                  No follow-ups on file.    Subjective   Adelso is a 28 year old, presenting for the following health issues:  Urgent Care and Ankle Pain (Left ankle pain x 5 days  overcompensating applying more weight on left leg and ankle started to hurt - no known injury - Hx Osteoarthritis on Right knee/)    20-year-old here with left ankle pain.  She points to the posterior ankle over the Achilles tendon insertion and around to the bottom of the heel on the lateral portion of.  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  She has never had ankle problems for the last 3 days it has been hurting especially by the end of the day.  When she walks and pushes off with the foot there is more pain.  She has some issues with her right knee and thinks that it is flared and she has been walking just a little differently recently                       Objective    /72 (BP Location: Left arm, Patient Position: Sitting, Cuff Size: Adult Large)   Pulse 65   Temp 97.7  F (36.5  C) (Tympanic)   Resp 16   Wt 133.8 kg (295 lb)   SpO2 96%   Breastfeeding No   BMI 41.14 kg/m    Body mass index is 41.14 kg/m .  Physical Exam  Vitals and nursing note reviewed.   Constitutional:       Appearance: Normal appearance.   Cardiovascular:      Rate and Rhythm: Normal rate.   Musculoskeletal:      Comments: No obvious deformity but tenderness is over the distal Achilles she has good flexion extension   Neurological:      Mental Status: She is alert.   Psychiatric:         Mood and Affect: Mood normal.         Behavior: Behavior normal.                    Signed Electronically by: Sundeep Rubi MD

## 2024-06-08 ENCOUNTER — HOSPITAL ENCOUNTER (OUTPATIENT)
Facility: CLINIC | Age: 29
Setting detail: OBSERVATION
Discharge: HOME | End: 2024-06-09
Attending: EMERGENCY MEDICINE
Payer: COMMERCIAL

## 2024-06-08 DIAGNOSIS — R45.851 SUICIDAL IDEATION: ICD-10-CM

## 2024-06-08 DIAGNOSIS — F84.0 AUTISM SPECTRUM DISORDER: ICD-10-CM

## 2024-06-08 DIAGNOSIS — F90.0 ATTENTION DEFICIT HYPERACTIVITY DISORDER (ADHD), PREDOMINANTLY INATTENTIVE TYPE: ICD-10-CM

## 2024-06-08 DIAGNOSIS — T50.902A INTENTIONAL DRUG OVERDOSE, INITIAL ENCOUNTER (H): ICD-10-CM

## 2024-06-08 DIAGNOSIS — F33.1 MAJOR DEPRESSIVE DISORDER, RECURRENT EPISODE, MODERATE (H): ICD-10-CM

## 2024-06-08 LAB
ALBUMIN SERPL BCG-MCNC: 4.3 G/DL (ref 3.5–5.2)
ALP SERPL-CCNC: 88 U/L (ref 40–150)
ALT SERPL W P-5'-P-CCNC: 42 U/L (ref 0–50)
AMPHETAMINES UR QL SCN: NORMAL
ANION GAP SERPL CALCULATED.3IONS-SCNC: 15 MMOL/L (ref 7–15)
APAP SERPL-MCNC: 6.2 UG/ML (ref 10–30)
AST SERPL W P-5'-P-CCNC: 29 U/L (ref 0–45)
ATRIAL RATE - MUSE: 77 BPM
BARBITURATES UR QL SCN: NORMAL
BASOPHILS # BLD AUTO: 0 10E3/UL (ref 0–0.2)
BASOPHILS NFR BLD AUTO: 0 %
BENZODIAZ UR QL SCN: NORMAL
BILIRUB SERPL-MCNC: 0.2 MG/DL
BUN SERPL-MCNC: 7.4 MG/DL (ref 6–20)
BZE UR QL SCN: NORMAL
CALCIUM SERPL-MCNC: 9.5 MG/DL (ref 8.6–10)
CANNABINOIDS UR QL SCN: NORMAL
CHLORIDE SERPL-SCNC: 103 MMOL/L (ref 98–107)
CREAT SERPL-MCNC: 0.57 MG/DL (ref 0.51–0.95)
DEPRECATED HCO3 PLAS-SCNC: 21 MMOL/L (ref 22–29)
DIASTOLIC BLOOD PRESSURE - MUSE: NORMAL MMHG
EGFRCR SERPLBLD CKD-EPI 2021: >90 ML/MIN/1.73M2
EOSINOPHIL # BLD AUTO: 0 10E3/UL (ref 0–0.7)
EOSINOPHIL NFR BLD AUTO: 0 %
ERYTHROCYTE [DISTWIDTH] IN BLOOD BY AUTOMATED COUNT: 12.3 % (ref 10–15)
FENTANYL UR QL: NORMAL
GLUCOSE SERPL-MCNC: 169 MG/DL (ref 70–99)
HCT VFR BLD AUTO: 39.7 % (ref 35–47)
HGB BLD-MCNC: 13.5 G/DL (ref 11.7–15.7)
IMM GRANULOCYTES # BLD: 0 10E3/UL
IMM GRANULOCYTES NFR BLD: 0 %
INTERPRETATION ECG - MUSE: NORMAL
LYMPHOCYTES # BLD AUTO: 0.8 10E3/UL (ref 0.8–5.3)
LYMPHOCYTES NFR BLD AUTO: 8 %
MCH RBC QN AUTO: 28.7 PG (ref 26.5–33)
MCHC RBC AUTO-ENTMCNC: 34 G/DL (ref 31.5–36.5)
MCV RBC AUTO: 84 FL (ref 78–100)
MONOCYTES # BLD AUTO: 0.1 10E3/UL (ref 0–1.3)
MONOCYTES NFR BLD AUTO: 1 %
NEUTROPHILS # BLD AUTO: 9.4 10E3/UL (ref 1.6–8.3)
NEUTROPHILS NFR BLD AUTO: 91 %
NRBC # BLD AUTO: 0 10E3/UL
NRBC BLD AUTO-RTO: 0 /100
OPIATES UR QL SCN: NORMAL
P AXIS - MUSE: 82 DEGREES
PCP QUAL URINE (ROCHE): NORMAL
PLATELET # BLD AUTO: 308 10E3/UL (ref 150–450)
POTASSIUM SERPL-SCNC: 3.5 MMOL/L (ref 3.4–5.3)
PR INTERVAL - MUSE: 172 MS
PROT SERPL-MCNC: 8.3 G/DL (ref 6.4–8.3)
QRS DURATION - MUSE: 98 MS
QT - MUSE: 350 MS
QTC - MUSE: 396 MS
R AXIS - MUSE: 43 DEGREES
RBC # BLD AUTO: 4.71 10E6/UL (ref 3.8–5.2)
SALICYLATES SERPL-MCNC: 11.2 MG/DL
SALICYLATES SERPL-MCNC: 15.2 MG/DL
SODIUM SERPL-SCNC: 139 MMOL/L (ref 135–145)
SYSTOLIC BLOOD PRESSURE - MUSE: NORMAL MMHG
T AXIS - MUSE: 73 DEGREES
VENTRICULAR RATE- MUSE: 77 BPM
WBC # BLD AUTO: 10.4 10E3/UL (ref 4–11)

## 2024-06-08 PROCEDURE — 250N000013 HC RX MED GY IP 250 OP 250 PS 637

## 2024-06-08 PROCEDURE — 36415 COLL VENOUS BLD VENIPUNCTURE: CPT | Performed by: EMERGENCY MEDICINE

## 2024-06-08 PROCEDURE — 99291 CRITICAL CARE FIRST HOUR: CPT | Mod: 25

## 2024-06-08 PROCEDURE — 93005 ELECTROCARDIOGRAM TRACING: CPT

## 2024-06-08 PROCEDURE — G0378 HOSPITAL OBSERVATION PER HR: HCPCS

## 2024-06-08 PROCEDURE — 99222 1ST HOSP IP/OBS MODERATE 55: CPT

## 2024-06-08 PROCEDURE — 80143 DRUG ASSAY ACETAMINOPHEN: CPT | Performed by: EMERGENCY MEDICINE

## 2024-06-08 PROCEDURE — 80307 DRUG TEST PRSMV CHEM ANLYZR: CPT | Performed by: EMERGENCY MEDICINE

## 2024-06-08 PROCEDURE — 80053 COMPREHEN METABOLIC PANEL: CPT | Performed by: EMERGENCY MEDICINE

## 2024-06-08 PROCEDURE — 80179 DRUG ASSAY SALICYLATE: CPT | Performed by: EMERGENCY MEDICINE

## 2024-06-08 PROCEDURE — 85025 COMPLETE CBC W/AUTO DIFF WBC: CPT | Performed by: EMERGENCY MEDICINE

## 2024-06-08 RX ORDER — SERTRALINE HYDROCHLORIDE 100 MG/1
100 TABLET, FILM COATED ORAL DAILY
Status: DISCONTINUED | OUTPATIENT
Start: 2024-06-09 | End: 2024-06-09 | Stop reason: HOSPADM

## 2024-06-08 RX ORDER — HYDROXYZINE HYDROCHLORIDE 50 MG/1
50 TABLET, FILM COATED ORAL EVERY 6 HOURS PRN
Status: DISCONTINUED | OUTPATIENT
Start: 2024-06-08 | End: 2024-06-09 | Stop reason: HOSPADM

## 2024-06-08 RX ORDER — METHYLPHENIDATE HYDROCHLORIDE 18 MG/1
18 TABLET, EXTENDED RELEASE ORAL EVERY MORNING
Status: DISCONTINUED | OUTPATIENT
Start: 2024-06-09 | End: 2024-06-09

## 2024-06-08 RX ORDER — LANOLIN ALCOHOL/MO/W.PET/CERES
3 CREAM (GRAM) TOPICAL
Status: DISCONTINUED | OUTPATIENT
Start: 2024-06-08 | End: 2024-06-09 | Stop reason: HOSPADM

## 2024-06-08 RX ORDER — TRAZODONE HYDROCHLORIDE 50 MG/1
50 TABLET, FILM COATED ORAL
Status: DISCONTINUED | OUTPATIENT
Start: 2024-06-08 | End: 2024-06-09 | Stop reason: HOSPADM

## 2024-06-08 RX ORDER — VITAMIN B COMPLEX
25 TABLET ORAL DAILY
Status: DISCONTINUED | OUTPATIENT
Start: 2024-06-09 | End: 2024-06-09 | Stop reason: HOSPADM

## 2024-06-08 RX ADMIN — HYDROXYZINE HYDROCHLORIDE 50 MG: 50 TABLET, FILM COATED ORAL at 23:32

## 2024-06-08 ASSESSMENT — ACTIVITIES OF DAILY LIVING (ADL)
ADLS_ACUITY_SCORE: 35
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
## 2024-06-08 NOTE — ED TRIAGE NOTES
Pt states she gave into the impulse thoughts and took 20 sertraline pills and Excedrin migraine 20 tabs last night around 2100 and had emesis X 3 times

## 2024-06-08 NOTE — ED NOTES
[facility]  ED to EMPATH Checklist:      Goal for EMPATH: Suicidality    Current Behavior: Quiet, Calm, and Cooperative    Safety Concerns: None    Legal Hold Status: [facility]    Medically Cleared by ED provider: Yes    Patient Therapeutically Searched: Therapeutic search by ED staff (strings, belts, shoes, pockets, electronics, etc.)    Belongings: In room locker    Independent Ambulation at Baseline: Yes/No: Yes    Participates in Care/Conversation: Yes/No: Yes    Patient Informed about EMPATH: Yes/No: Yes    DEC: Ordered and pending    Patient Ready to be Transferred to EMPATH? Yes/No: Yes    PT HAS BEEN CLEARED BY POISON CONTROL.  MEDICALLY CLEARED. GIVEN EMPATH BROCHURE

## 2024-06-08 NOTE — ED PROVIDER NOTES
"  Emergency Department Note      History of Present Illness     Chief Complaint  Psychiatric Evaluation    HPI  Adelso Portillo is a 28 year old female with history of depressive disorder, avoidant personality disorder, and autism spectrum disorder who presents to the ED for evaluation of psychiatric state. The patient reports that yesterday at 2100 she took 20 Excedrin and 20 50 mg Sertraline, she notes that this act was suicidal in nature, stating that if she did not die she would come to the ED. The patient reports that she threw up three times around 0300 today. Endorses nausea. She denies any pain or a history of similar ingestion instances. She adds that she did not take any alcohol, drugs, or extra prescription medications.     Independent Historian  None    Review of External Notes  None  Past Medical History   Medical History and Problem List  Depressive disorder  Obesity   Tinea pedis   Primary dysmenorrhea   Avoidant personality disorder   Autism spectrum disorder   Degenerative joint disease   ADHD   Vitamin D deficiency   Scoliosis     Medications  Methylphenidate HCl  Zoloft  Kyleena     Surgical History   [place] teeth extraction    Physical Exam   Patient Vitals for the past 24 hrs:   BP Temp Temp src Pulse Resp SpO2 Height Weight   06/08/24 1328 130/84 98.2  F (36.8  C) Oral 89 18 99 % 1.803 m (5' 11\") 132 kg (291 lb 1.6 oz)   06/08/24 1215 119/85 -- -- -- -- -- -- --   06/08/24 0914 -- -- -- 77 21 97 % -- --   06/08/24 0709 -- -- -- -- 18 -- -- --   06/08/24 0708 (!) 143/82 98.7  F (37.1  C) Temporal 111 -- 97 % 1.829 m (6') 131.5 kg (290 lb)     Physical Exam  General: Alert, No distress. Nontoxic appearance  Head: No signs of trauma.   Mouth/Throat: Oropharynx moist.   Eyes: Conjunctivae are normal. Pupils are equal..   Neck: Normal range of motion.    CV: Appears well perfused.  Resp:No respiratory distress.   MSK: Normal range of motion. No obvious deformity.   Neuro: The patient is alert and " interactive. ARRIAZA. Speech normal. GCS 15  Skin: No lesion or sign of trauma noted.   Psych: normal mood and affect. behavior is normal.  The patient admits to vague suicidal ideation    Diagnostics   Lab Results   Labs Ordered and Resulted from Time of ED Arrival to Time of ED Departure   COMPREHENSIVE METABOLIC PANEL - Abnormal       Result Value    Sodium 139      Potassium 3.5      Carbon Dioxide (CO2) 21 (*)     Anion Gap 15      Urea Nitrogen 7.4      Creatinine 0.57      GFR Estimate >90      Calcium 9.5      Chloride 103      Glucose 169 (*)     Alkaline Phosphatase 88      AST 29      ALT 42      Protein Total 8.3      Albumin 4.3      Bilirubin Total 0.2     ACETAMINOPHEN LEVEL - Abnormal    Acetaminophen 6.2 (*)    CBC WITH PLATELETS AND DIFFERENTIAL - Abnormal    WBC Count 10.4      RBC Count 4.71      Hemoglobin 13.5      Hematocrit 39.7      MCV 84      MCH 28.7      MCHC 34.0      RDW 12.3      Platelet Count 308      % Neutrophils 91      % Lymphocytes 8      % Monocytes 1      % Eosinophils 0      % Basophils 0      % Immature Granulocytes 0      NRBCs per 100 WBC 0      Absolute Neutrophils 9.4 (*)     Absolute Lymphocytes 0.8      Absolute Monocytes 0.1      Absolute Eosinophils 0.0      Absolute Basophils 0.0      Absolute Immature Granulocytes 0.0      Absolute NRBCs 0.0     SALICYLATE LEVEL - Normal    Salicylate 15.2     SALICYLATE LEVEL - Normal    Salicylate 11.2     URINE DRUG SCREEN PANEL - Normal    Amphetamines Urine Screen Negative      Barbituates Urine Screen Negative      Benzodiazepine Urine Screen Negative      Cannabinoids Urine Screen Negative      Cocaine Urine Screen Negative      Fentanyl Qual Urine Screen Negative      Opiates Urine Screen Negative      PCP Urine Screen Negative         Imaging  None    EKG   ECG taken at 0742, ECG read at 0802  Normal sinus rhythm with sinus arrhythmia   Low voltage QRS  Nonspecific T wave abnormality   Rate 77 bpm. SC interval 172 ms. QRS  duration 98 ms. QT/QTc 350/396 ms. P-R-T axes 82 43 73.    Independent Interpretation  None  ED Course    Medications Administered  Medications - No data to display    Procedures  Procedures     Discussion of Management  I consulted with poison control x 2    Social Determinants of Health adding to complexity of care  Stress/Adjustment Disorders    ED Course  ED Course as of 06/08/24 1131   Sat Jun 08, 2024   0752 I obtained history and examined the patient as noted above.    0800 I consulted with poison control.    0837 I updated with poison control.      Medical Decision Making / Diagnosis       MDM  Adelso Portillo is a 28 year old female the patient is presenting to the ER approximately 10-1/2 hours after ingestion.  The patient ingested sertraline and Excedrin (which includes aspirin and acetaminophen).  Poison control was consulted.  There is concern for possible serotonin syndrome after the sertraline overdose but patient's presenting 10 and half hours after ingestion and the peak levels of sertraline would not be expected to be in 8 hours.  EKG shows no evidence of QTc prolongation.  The patient's acetaminophen level is nontoxic.  Because of the the irregular absorption pattern of aspirin 2 levels were required to determine that she was not going to be be at the toxic level.  The patient was eventually medically cleared for further psychiatric evaluation in the empath unit.    Disposition  [facility]     ICD-10 Codes:    ICD-10-CM    1. Suicidal ideation  R45.851       2. Depression, unspecified depression type  F32.A            Critical care time 30 minutes for evaluation of possible lethal overdose      Marina DANIELLE, am serving as a scribe at 8:00 AM on 6/8/2024 to document services personally performed by Ricky Caro MD based on my observations and the provider's statements to me.      Ricky Caro MD  06/08/24 [id number]

## 2024-06-08 NOTE — Clinical Note
Adelso Portillo was seen and treated in our emergency department on 6/8/2024.         Sincerely,     [facility] Emergency Dept

## 2024-06-09 VITALS
TEMPERATURE: 98.3 F | HEIGHT: 71 IN | WEIGHT: 291.1 LBS | RESPIRATION RATE: 18 BRPM | SYSTOLIC BLOOD PRESSURE: 130 MMHG | DIASTOLIC BLOOD PRESSURE: 84 MMHG | OXYGEN SATURATION: 98 % | HEART RATE: 88 BPM | BODY MASS INDEX: 40.75 KG/M2

## 2024-06-09 PROCEDURE — 99239 HOSP IP/OBS DSCHRG MGMT >30: CPT | Performed by: NURSE PRACTITIONER

## 2024-06-09 PROCEDURE — G0378 HOSPITAL OBSERVATION PER HR: HCPCS

## 2024-06-09 PROCEDURE — 250N000013 HC RX MED GY IP 250 OP 250 PS 637

## 2024-06-09 RX ORDER — SERTRALINE HYDROCHLORIDE 25 MG/1
100 TABLET, FILM COATED ORAL DAILY
COMMUNITY
Start: 2024-06-09 | End: 2024-09-30 | Stop reason: ALTCHOICE

## 2024-06-09 RX ORDER — SERTRALINE HYDROCHLORIDE 25 MG/1
100 TABLET, FILM COATED ORAL DAILY
Qty: 120 TABLET | Refills: 0 | Status: SHIPPED | OUTPATIENT
Start: 2024-06-09 | End: 2024-09-30 | Stop reason: ALTCHOICE

## 2024-06-09 RX ORDER — METHYLPHENIDATE HYDROCHLORIDE 18 MG/1
18 TABLET ORAL DAILY
Status: DISCONTINUED | OUTPATIENT
Start: 2024-06-09 | End: 2024-06-09 | Stop reason: HOSPADM

## 2024-06-09 RX ORDER — HYDROXYZINE HYDROCHLORIDE 25 MG/1
50 TABLET, FILM COATED ORAL EVERY 6 HOURS PRN
Qty: 14 TABLET | Refills: 0 | Status: SHIPPED | OUTPATIENT
Start: 2024-06-09 | End: 2024-09-30 | Stop reason: ALTCHOICE

## 2024-06-09 RX ADMIN — METHYLPHENIDATE HYDROCHLORIDE 18 MG: 18 TABLET, EXTENDED RELEASE ORAL at 08:32

## 2024-06-09 RX ADMIN — Medication 25 MCG: at 08:31

## 2024-06-09 RX ADMIN — SERTRALINE HYDROCHLORIDE 100 MG: 100 TABLET ORAL at 08:32

## 2024-06-09 ASSESSMENT — COLUMBIA-SUICIDE SEVERITY RATING SCALE - C-SSRS
2. HAVE YOU ACTUALLY HAD ANY THOUGHTS OF KILLING YOURSELF?: NO
TOTAL  NUMBER OF ABORTED OR SELF INTERRUPTED ATTEMPTS SINCE LAST CONTACT: NO
SUICIDE, SINCE LAST CONTACT: NO
6. HAVE YOU EVER DONE ANYTHING, STARTED TO DO ANYTHING, OR PREPARED TO DO ANYTHING TO END YOUR LIFE?: NO
ATTEMPT SINCE LAST CONTACT: NO
TOTAL  NUMBER OF INTERRUPTED ATTEMPTS SINCE LAST CONTACT: NO
1. SINCE LAST CONTACT, HAVE YOU WISHED YOU WERE DEAD OR WISHED YOU COULD GO TO SLEEP AND NOT WAKE UP?: NO

## 2024-06-09 ASSESSMENT — ACTIVITIES OF DAILY LIVING (ADL)
ADLS_ACUITY_SCORE: 35

## 2024-06-09 NOTE — ED NOTES
"Pt reports having a headache at about 3/10. Pt declined anything for pain and stated \"I'm not sure I should take anything because of yesterday\". Pt is agreeable to staying on observation tonight with plan to reassess tomorrow morning. Plan to increase her dose of sertraline from 50mg to 100mg. Pt denies SI at this time.     Pt was tearful earlier and discussed that she was feeling guilty that she didn't feed her bunny before coming to the hospital. Pt's feelings were validated.   "

## 2024-06-09 NOTE — ED NOTES
Pt has spent time working on a puzzle in the [place]. She reports no SI/HI, A/V barnes. Pt reports no anxiety. Plan is to stay on observation status tonight and increase sertraline to 100mg tomorrow morning. Pt presents calm in mood with a flat affect, is withdrawn unless engaged.
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
## 2024-06-09 NOTE — PROGRESS NOTES
Patient's mood is calm, slept well last night she reported she had significant stressors over the past month, and she is glad she came in here after her overdosed attempt.  She recognizes that perhaps she was a little quick to react to the stressor but at this time she denies any suicidal or homicidal ideation and she is ready to go home.

## 2024-06-09 NOTE — ED NOTES
Pt came up to the nurse's station and asked something for anxiety. This writer offered PRN Atarax and she was agreeable to take.

## 2024-06-09 NOTE — PROGRESS NOTES
[facility] Progress Note    Client Name: Adelso Portillo  Date: June 9, 2024  Service Type:  Group Therapy  Facilitator: Sanchez Nunez        Response:  Patient did not participate in group.      Sanchez Nunez

## 2024-06-09 NOTE — DISCHARGE INSTRUCTIONS
"Scheduled Appointment(s):    Type: Telepsychiatry  Provider: Corrie ABARCA  CNP,PMHNP,RN    Location: [facility]  [address]   Phone: [phone]   Date/Time: 6/11/2024 12:00 pm     Type: Teletherapy  Provider: Mae Perry MA  [facility]    Location: "[employer]  [address]   Phone: [phone]   Date/Time: 6/12/2024 12:00 pm   Instructions: \"Greetings! Upon receipt of a referral from [facility], our scheduling department will call and text you to confirm the appointment. Once the appointment is confirmed, we will send you intake forms to your email of which need to be completed upon receipt to keep the scheduled appointment. If you do not have an email, we will encourage you to schedule your appointment with us in person. We look forward to working with you! [email or web address]\"    Dialectical Behavior Therapy (DBT) Resources:    [facility])  Website: https://[facility]-mn.com/mental-health-services/dbt-therapy-mn/  Location(s): [facility], [place], [facility]  Phone: [phone]    [facility]:  Website: https://www.ContentWatch/our-services/dialectical-behavior-therapy-dbt/  Location: Throughout the [facility], refer to website  Phone: [phone]     [facility] [place] [facility]:   Website: https://ozzyb.[facility]/dialectical-behavior-therapy-program  Location: [facility] ([facility]), [address], [place], [address]  Phone: [phone]  Day/Time: Mondays or Tuesdays, 4-6 p.m.     DBT & Mental Health Services (MHS)   Website: https://www.mhs-dbt.com/programs/adults/  Location: [place], [facility], [place], [place], & [facility]  Phone: [phone]      Aftercare Plan  If I am feeling unsafe or I am in a crisis, I will:   Contact my established care providers   Call " "the National Suicide Prevention Lifeline: 988  Go to the nearest emergency room   Call 911     [facility] Support Groups/Resources:    Website: https://[facility].org/support/support-groups-for-adults-living-with-a-mental-illness/  Location(s): [place], [place], [place], [place], [place], & [facility].   Phone: [phone]  Email: [email or web address] [email or web address]  About: LITZY Monique is a support group for adults living with mental illness. Groups are free and meet for 90 minutes. All groups are led by trained facilitators who also live with mental illness. Groups provide a welcoming, safe, judgment-free space to share challenges, successes, and learn from one another. There are also groups that focus on LGBTQ+ and BI-POC identities.      Crisis Lines  Crisis Text Line  Text 668603  You will be connected with a trained live crisis counselor to provide support.    Por espanol, texto  LUCY a 416081 o texto a 442-AYUDAME en WhatsApp    [employer] (LGBTQ Youth Crisis Line)  6.532.477.2517  text START to 183-960    CRISIS RESOURCES  24/7 Crisis Hotlines (national & state-wide):  National Suicide Prevention Lifeline at 988  Throughout  [place]: call **CRISIS (**056563)  Crisis Text Line: is available for free, 24/7 by texting \"[place]\" to 387441  Lifeline Chat can connect you with a counselor for emotional support and other services via web chat https://suicidepreventionlifeline.org/chat/    24/7 Crisis Hotlines (by county):  [place] - Adult: 094-192-6304    [facility] Crisis Services Fact Sheet: https://[facility].org/wp-content/uploads/sites/48/2019/2019/06/Kbzrtc-Plklva-Wtevxpqz__2495.06.03.pdf    Other [facility] Crisis Resources:   https://[facility].org/support/information-and-resources/crisis-resources/    Walk-In / Emergency Room Options  Nerissa Busch (Behavioral Health Emergency Room; [place]): [phone]   [facility] Acute Psychiatric Service Walk-In Crisis Intervention ([place]): [phone]  [facility] " "[facility] ([place]): [phone]  [facility] ([place]): [phone]    More about [facility]:  Phone: [phone] (Emergency Room)  Address: [facility] [place] [address]    [facility] Em[facility] (Emergency Psychiatric Assessment, Treatment, and Healing) is like an emergency room mental health unit. EmPATH is an innovative approach to emergency mental health care that is designed to guide people safely through a crisis while helping them build coping skills for the future. The unit is designed for acute psychiatric patients to receive assessment and evaluation in a therapeutic and least restrictive setting.    Complementing the emergency department, the new adult EmPATH unit provides a calm and comforting environment, allowing the movement and human interaction that is vital in the first 24 hours of treatment. After a short medical evaluation in the emergency department, patients come to a calming, living room-style open space with comfortable recliners and self-serve refreshment stations. Open nursing stations and unlocked rooms create an atmosphere of trust and allyship with the staff, while the mix of daylighting, views to nature, or appropriate imagery establishes a sense of hope. In addition to providing a conducive environment for treatment, the EmPATH unit provides a gentle and benign setting for the care team to constantly evaluate a patient and discharge them with an appropriate treatment plan.     Community Resources  Fast Tracker  Linking people to mental health and substance use disorder resources  fasttrackermn.org     [place] Mental Health Warm Line  Peer to peer support  Monday thru Saturday, 12 pm to 10 pm  916.300.3514 or 7.186.656.0589  Text \"Support\" to 46880    National [place] on Mental Illness (LITZY)  466.173.5951 or 1.888.LITZY.HELPS      Mental Health Apps  My3  https://[employer].org/    SmailexeBox  " https://[employer]/apps/virtual-hope-box/      Additional Information  Today you were seen by a licensed mental health professional through Triage and Transition services, [facility]iders (P)  for a crisis assessment in the Emergency Department at [facility].  It is recommended that you follow up with your established providers (psychiatrist, mental health therapist, and/or primary care doctor - as relevant) as soon as possible. Coordinators from [facility] will be calling you in the next 24-48 hours to ensure that you have the resources you need.  You can also contact [facility] coordinators directly at [phone]. You may have been scheduled for or offered an appointment with a mental health provider. [facility] maintains an extensive network of licensed behavioral health providers to connect patients with the services they need.  We do not charge providers a fee to participate in our referral network.  We match patients with providers based on a patient's specific needs, insurance coverage, and location.  Our first effort will be to refer you to a provider within your care system, and will utilize providers outside your care system as needed.

## 2024-06-09 NOTE — ED PROVIDER NOTES
"[facility] Unit - Psychiatric Observation Discharge Summary  [facility] Emergency Department  Discharge Date: 6/9/2024    Adelso Portillo MRN: [id number]   Age: 28 year old YOB: 1995     Brief HPI & Initial ED Course     Chief Complaint   Patient presents with    Psychiatric Evaluation     HPI  Adelso Portillo is a 28 year old female with history notable for ASD, depression, ADHD, anxiety, and depression who presented to the ED following an intentional overdose in which she took excedrin X 20 and sertraline 50 mg X 2. She was medically cleared in the ED and transferred to [facility] for psychiatric consult where she was assessed by LOUANN Barger, who placed patient of observation status while initiating hydroxazine for anxiety/sleep and increased zoloft from 50 mg to 100 mg daily to target mood and anxiety. No acute issues overnight.     Today, 6/9/24, patient is seen for follow up overdose, anxiety, and depression. She's been in the ED setting for approximately 29 hours at time of examination. She reports some anxiety overnight, utilizing PRN atarax to manage as well as assist with sleep. She agrees the medication decreased the intensity of ruminating thoughts. She identifies this as a chronic symptom, most notably occurring at night. She wonders about changing the timing of her Zoloft from morning to night to see if this will help alleviate the symptoms. She is also interested in a script for atarax to use if changing administration time does not impact her symptoms. Discussed use of it more regularly while waiting for zoloft to reach a therapeutic level. She reports feeling both mentally and physically \"better\" today. States her headache and nausea have resoloved. She was able to eat breakfast without issues. Denies any suicidal thoughts or urges. Looking forward to returning to apartment so they clean and feed their pet rabbit. 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 " "They found their stay on [facility] beneficial being in a safe and structured environment with a multidisciplinary approach.         Physical Examination   BP: 130/84  Pulse: 88  Temp: 98.3  F (36.8  C)  Resp: 18  Height: 180.3 cm (5' 11\")  Weight: 132 kg (291 lb 1.6 oz)  SpO2: 98 %    Physical Exam  General: Appears stated age.   Neuro: Alert and fully oriented. Extremities appear to demonstrate normal strength on visual inspection.   Integumentary/Skin: no rash visualized, normal color    Psychiatric Examination   Appearance: awake, alert and adequately groomed  Attitude:  cooperative  Eye Contact:  good  Mood:  better  Affect:  intensity is normal  Speech:  clear, coherent  Psychomotor Behavior:  no evidence of tardive dyskinesia, dystonia, or tics and intact station, gait and muscle tone  Thought Process:  logical, linear, and goal oriented  Associations:  no loose associations  Thought Content:  no evidence of suicidal ideation or homicidal ideation and no evidence of psychotic thought  Insight:  good  Judgement:  intact  Oriented to:  time, person, and place  Attention Span and Concentration:  intact  Recent and Remote Memory:  intact  Language: able to name/identify objects without impairment  Fund of Knowledge: intact with awareness of current and past events    Results     ED Course as of 06/09/24 1208   Sat Jun 08, 2024   0752 I obtained history and examined the patient as noted above.    0800 I consulted with poison control.    0837 I updated with poison control.        Labs Ordered and Resulted from Time of ED Arrival to Time of ED Departure   COMPREHENSIVE METABOLIC PANEL - Abnormal       Result Value    Sodium 139      Potassium 3.5      Carbon Dioxide (CO2) 21 (*)     Anion Gap 15      Urea Nitrogen 7.4      Creatinine 0.57      GFR Estimate >90      Calcium 9.5      Chloride 103      Glucose 169 (*)     Alkaline Phosphatase 88      AST 29      ALT 42      Protein Total 8.3      Albumin 4.3      Bilirubin " Total 0.2     ACETAMINOPHEN LEVEL - Abnormal    Acetaminophen 6.2 (*)    CBC WITH PLATELETS AND DIFFERENTIAL - Abnormal    WBC Count 10.4      RBC Count 4.71      Hemoglobin 13.5      Hematocrit 39.7      MCV 84      MCH 28.7      MCHC 34.0      RDW 12.3      Platelet Count 308      % Neutrophils 91      % Lymphocytes 8      % Monocytes 1      % Eosinophils 0      % Basophils 0      % Immature Granulocytes 0      NRBCs per 100 WBC 0      Absolute Neutrophils 9.4 (*)     Absolute Lymphocytes 0.8      Absolute Monocytes 0.1      Absolute Eosinophils 0.0      Absolute Basophils 0.0      Absolute Immature Granulocytes 0.0      Absolute NRBCs 0.0     SALICYLATE LEVEL - Normal    Salicylate 15.2     SALICYLATE LEVEL - Normal    Salicylate 11.2     URINE DRUG SCREEN PANEL - Normal    Amphetamines Urine Screen Negative      Barbituates Urine Screen Negative      Benzodiazepine Urine Screen Negative      Cannabinoids Urine Screen Negative      Cocaine Urine Screen Negative      Fentanyl Qual Urine Screen Negative      Opiates Urine Screen Negative      PCP Urine Screen Negative         Observation Course   The patient was found to have a psychiatric condition that would benefit from an observation stay in the emergency department for further psychiatric stabilization and/or coordination of a safe disposition. The plan upon observation admission included serial assessments of psychiatric condition, potential administration of medications if indicated, further disposition pending the patient's psychiatric course during the monitoring period.     Serial assessments of the patient's psychiatric condition were performed. Nursing notes were reviewed. During the observation period, the patient did not require medications for agitation, and did not require restraints/seclusion for patient and/or provider safety.     After a period of working with the treatment team on the [facility] unit, the patient's mental state improved to allow a  safe transition to outpatient care. After counseling on the diagnosis, work-up, and treatment plan, the patient was discharged. Close follow-up with a psychiatrist and/or therapist was recommended and community psychiatric resources were provided. Patient is to return to the ED if any urgent or potentially life-threatening concerns.     Discharge Diagnoses:   Final diagnoses:   Suicidal ideation   Major depressive disorder, recurrent episode, moderate (H)   Attention deficit hyperactivity disorder (ADHD), predominantly inattentive type   Autism spectrum disorder   Intentional drug overdose, initial encounter (H)       Treatment Plan:  -Discharge Home with safety plan in place.  -Scripts sent to outpatient pharmacy:  *hydroxyzine 25 mg tablets, two tablets, every 6 hours as needed for sleep/anxiety  *sertraline 25 mg tablets, four tablets, daily for anxiety/depression.  -Medication education provided this visit includes, rationale for medication, importance of compliance, medication interactions, and common side effects. Patient agreeable.   -Telepschyiatric visit scheduled for 6/1//24  -Outpatient information provided for additional community supports and therapy.  -Discussed benefit of obtaining a Genesite test outpatient.      At the time of discharge, the patient's acute suicide risk was determined to be low due to the following factors: Reduction in the intensity of mood/anxiety symptoms that preceded the admission, denial of suicidal thoughts, denies feeling helpless or helpless, not currently under the influence of alcohol or illicit substances, denies experiencing command hallucinations, no immediate access to firearms. The patient's acute risk could be higher if noncompliant with their treatment plan, medications, follow-up appointments or using illicit substances or alcohol. Protective factors include: social supports, pet, stable housing, employment, [employer] beliefs.    I spent more than 30 minutes on  discharge day activities.    --  LOUANN Amado CNP  [facility] EMERGENCY DEPT  [facility] Unit       Maricruz Barrios APRN CNP  06/09/24 [id number]

## 2024-06-09 NOTE — PROGRESS NOTES
"Triage and Transition Services Extended Care Reassessment     Patient: Adelso goes by \"Adelso,\" uses she/her pronouns  Date of Service: June 9, 2024  Site of Service: [facility] EMERGENCY DEPT                             EMP08  Patient was seen yes  Mode of Assessment: In person       History of Patient's Original Emergency Room Encounter: Pt experiencing worsening psychosocial stressors over the last month which have increased depressive and anxious symptoms. Under distress pt attempted suicide by overdose; pt reports relief that she survived the attempt and reports wanting to get back on track. Pt reports recent stressors include moving, work/financial stress, and interpersonal stress. Pt denied past suicide attempts and SI. She endorsed past passive SI.    Presentation Summary: Pt presents as calm and cooperative for reassessment. Pt presented to ED due to suicide attempt via overdose on excedrin and sertraline. Pt has been experiencing psychosocial stressors that felt overwhelming. Pt reports when she woke up she was grateful she didn't pass and drove herself to ED. Pt reports feeling more stable since staying at [facility] unit. Pt denies SI, SIB, and HI. Pt denies psychotic symptoms. Pt reports some anxiety about returning home but reports readiness to return now that she feels stable. Pt engaged in safety planning process by identifying early warning signs, coping skills, and external supports. Referrals completed for OP therapy and psychiatry with resources for [facility] support groups included in AVS. Pt is recommended for discharge due to symptom stabilization and engagement in safety planning process.    Changes Observed Since Initial Assessment: symptom improvement with pt reporting they feel more stable     Therapeutic Interventions Provided: Engaged in safety planning, Coached on coping techniques/relaxation skills to help improve distress tolerance and managing intense emotions.    Current " Symptoms: anxious     Mental Status Exam   Affect: Appropriate  Appearance: Appropriate  Attention Span/Concentration: Attentive  Eye Contact: Engaged    Fund of Knowledge: Appropriate   Language /Speech Content: Fluent  Language /Speech Volume: Normal  Language /Speech Rate/Productions: Normal  Recent Memory: Intact  Remote Memory: Intact  Mood: Normal  Orientation to Person: Yes   Orientation to Place: Yes  Orientation to Time of Day: Yes  Orientation to Date: Yes     Situation (Do they understand why they are here?): Yes  Psychomotor Behavior: Normal  Thought Content: Clear  Thought Form: Intact    Treatment Objective(s) Addressed: safety planning, identifying an appropriate aftercare plan, assessing safety, identifying and practicing coping strategies    Patient Response to Interventions: eager to participate    Progress Towards Goals:  Patient Reports Symptoms Are: improving  Patient Progress Toward Goals: is making progress    Case Management: Case Management Included: completing or following up on referrals  Summary of Interaction: Referrals completed for OP therapy and psychiatry. Resources provided for [facility] support groups.    C-SSRS Since Last Contact:   1. Wish to be Dead (Since Last Contact): No  2. Non-Specific Active Suicidal Thoughts (Since Last Contact): No     Actual Attempt (Since Last Contact): No  Has subject engaged in non-suicidal self-injurious behavior? (Since Last Contact): No  Interrupted Attempts (Since Last Contact): No  Aborted or Self-Interrupted Attempt (Since Last Contact): No  Preparatory Acts or Behavior (Since Last Contact): No  Suicide (Since Last Contact): No     Calculated C-SSRS Risk Score (Since Last Contact): No Risk Indicated    Plan: Final Disposition / Recommended Care Path: discharge  Plan for Care reviewed with assigned Medical Provider: yes  Plan for Care Team Review: provider, RN  Comments: Maricruz Barrios  Patient and/or validated legal guardian concurs: yes  Clinical  Substantiation: Pt recommended for discharge due to symptom stabilization and engagement in safety planning. Pt presented to ED due to suicide attempt via overdose on excedrin and sertraline. Over last weeks, pt has been experiencing psychosocial stressors that felt overwhelming. Pt reports feeling more stable since staying at [facility] unit. Pt denies SI, SIB, and HI. Pt denies psychotic symptoms. Pt reports some anxiety about returning home but reports readiness to return now that she feels stable and knows who to call and what to do for additional support. Pt engaged in safety planning process by identifying early warning signs, coping skills, and external supports. Referrals completed for OP therapy and psychiatry with resources for [facility] support groups included in AVS. Pt is recommended for discharge due to symptom stabilization and engagement in safety planning process. No further [facility] tasks needed at this time.    Legal Status: Legal Status at Admission: Voluntary/Patient has signed consent for treatment    Session Status: Time session started: 0750  Time session ended: 0828  Session Duration (minutes): 38 minutes  Session Number: 1  Anticipated number of sessions or this episode of care: 1  Date of most recent diagnostic assessment:  (unknown)    Session Start Time: 0750  Session Stop Time: 0828  CPT codes: 89472 - Psychotherapy (with patient) - 45 (38-52*) min  Time Spent: 38 minutes      CPT code(s) utilized: 77467 - Psychotherapy (with patient) - 45 (38-52*) min    Diagnosis:   Patient Active Problem List   Diagnosis Code    Class 1 obesity due to excess calories with serious comorbidity and body mass index (BMI) of 33.0 to 33.9 in adult E66.09, Z68.33    Major depressive disorder, recurrent episode, moderate (H) F33.1    Suicidal ideation R45.851    Autism spectrum disorder F84.0    Attention deficit hyperactivity disorder (ADHD), predominantly inattentive type F90.0       Primary Problem This Admission:  Active Hospital Problems    Major depressive disorder, recurrent episode, moderate (H)  F33.1       Suicidal ideation  R45.851      Autism spectrum disorder   F84.0       Attention deficit hyperactivity disorder (ADHD), predominantly inattentive type   F90.0        Sanchez Nunez   Licensed Mental Health Professional (LMHP), [facility]  [phone]

## 2024-06-09 NOTE — PROGRESS NOTES
Triage & Transition Services, Extended Care     Client Name: Adelso Portillo    Date: June 9, 2024    Patient was seen    Mode of Assessment:      Service Type: refused to attend group session  Session Start Time:  2100    Session End Time: 2105  Session Length: 5  Site Location: [facility] EMERGENCY DEPT                             EMP08  Total Number ofAttendees: 0  Topic:   other (see comments) (Group was not conducted due to lack of attendence.)   Response:       Elli Sheppard   Licensed Mental Health Professional (LMHP), Extended Care  [phone]

## 2024-09-22 ENCOUNTER — HEALTH MAINTENANCE LETTER (OUTPATIENT)
Age: 29
End: 2024-09-22

## 2024-09-29 ASSESSMENT — PATIENT HEALTH QUESTIONNAIRE - PHQ9
SUM OF ALL RESPONSES TO PHQ QUESTIONS 1-9: 4
10. IF YOU CHECKED OFF ANY PROBLEMS, HOW DIFFICULT HAVE THESE PROBLEMS MADE IT FOR YOU TO DO YOUR WORK, TAKE CARE OF THINGS AT HOME, OR GET ALONG WITH OTHER PEOPLE: SOMEWHAT DIFFICULT
SUM OF ALL RESPONSES TO PHQ QUESTIONS 1-9: 4

## 2024-09-30 ENCOUNTER — OFFICE VISIT (OUTPATIENT)
Dept: INTERNAL MEDICINE | Facility: CLINIC | Age: 29
End: 2024-09-30
Payer: COMMERCIAL

## 2024-09-30 VITALS
TEMPERATURE: 97.9 F | BODY MASS INDEX: 41.94 KG/M2 | OXYGEN SATURATION: 99 % | SYSTOLIC BLOOD PRESSURE: 118 MMHG | WEIGHT: 293 LBS | DIASTOLIC BLOOD PRESSURE: 80 MMHG | HEART RATE: 67 BPM

## 2024-09-30 DIAGNOSIS — R45.851 SUICIDAL IDEATION: ICD-10-CM

## 2024-09-30 DIAGNOSIS — F90.0 ATTENTION DEFICIT HYPERACTIVITY DISORDER, INATTENTIVE TYPE: ICD-10-CM

## 2024-09-30 DIAGNOSIS — B35.1 ONYCHOMYCOSIS: Primary | ICD-10-CM

## 2024-09-30 DIAGNOSIS — F33.1 MAJOR DEPRESSIVE DISORDER, RECURRENT EPISODE, MODERATE (H): ICD-10-CM

## 2024-09-30 DIAGNOSIS — F41.8 OTHER SPECIFIED ANXIETY DISORDERS: ICD-10-CM

## 2024-09-30 DIAGNOSIS — Z79.899 ENCOUNTER FOR LONG-TERM (CURRENT) USE OF MEDICATIONS: ICD-10-CM

## 2024-09-30 PROBLEM — M41.9 SCOLIOSIS: Status: ACTIVE | Noted: 2020-06-09

## 2024-09-30 PROBLEM — M25.561 KNEE PAIN, RIGHT: Status: RESOLVED | Noted: 2018-09-19 | Resolved: 2024-09-30

## 2024-09-30 PROBLEM — F84.0 AUTISM SPECTRUM DISORDER: Status: ACTIVE | Noted: 2021-07-12

## 2024-09-30 PROBLEM — F60.6 AVOIDANT PERSONALITY DISORDER (H): Status: ACTIVE | Noted: 2021-07-12

## 2024-09-30 PROBLEM — T14.91XA SUICIDE ATTEMPT (H): Status: RESOLVED | Noted: 2024-07-02 | Resolved: 2024-09-30

## 2024-09-30 PROBLEM — M17.9 DJD (DEGENERATIVE JOINT DISEASE) OF KNEE: Status: ACTIVE | Noted: 2021-06-22

## 2024-09-30 PROBLEM — N94.4 PRIMARY DYSMENORRHEA: Status: ACTIVE | Noted: 2022-05-12

## 2024-09-30 PROBLEM — E55.9 VITAMIN D DEFICIENCY: Status: RESOLVED | Noted: 2020-06-09 | Resolved: 2024-09-30

## 2024-09-30 PROBLEM — B35.3 TINEA PEDIS: Status: ACTIVE | Noted: 2018-09-19

## 2024-09-30 LAB
AMPHETAMINES UR QL: NOT DETECTED
BARBITURATES UR QL SCN: NOT DETECTED
BENZODIAZ UR QL SCN: NOT DETECTED
BUPRENORPHINE UR QL: NOT DETECTED
CANNABINOIDS UR QL: NOT DETECTED
COCAINE UR QL SCN: NOT DETECTED
D-METHAMPHET UR QL: NOT DETECTED
METHADONE UR QL SCN: NOT DETECTED
OPIATES UR QL SCN: NOT DETECTED
OXYCODONE UR QL SCN: NOT DETECTED
PCP UR QL SCN: NOT DETECTED
TRICYCLICS UR QL SCN: NOT DETECTED

## 2024-09-30 PROCEDURE — 90471 IMMUNIZATION ADMIN: CPT | Performed by: NURSE PRACTITIONER

## 2024-09-30 PROCEDURE — 90480 ADMN SARSCOV2 VAC 1/ONLY CMP: CPT | Performed by: NURSE PRACTITIONER

## 2024-09-30 PROCEDURE — 91320 SARSCV2 VAC 30MCG TRS-SUC IM: CPT | Performed by: NURSE PRACTITIONER

## 2024-09-30 PROCEDURE — 80306 DRUG TEST PRSMV INSTRMNT: CPT | Performed by: NURSE PRACTITIONER

## 2024-09-30 PROCEDURE — 99214 OFFICE O/P EST MOD 30 MIN: CPT | Mod: 25 | Performed by: NURSE PRACTITIONER

## 2024-09-30 PROCEDURE — 90656 IIV3 VACC NO PRSV 0.5 ML IM: CPT | Performed by: NURSE PRACTITIONER

## 2024-09-30 RX ORDER — LAMOTRIGINE 25 MG/1
1 TABLET ORAL
COMMUNITY
Start: 2024-07-15 | End: 2024-09-30 | Stop reason: ALTCHOICE

## 2024-09-30 RX ORDER — TERBINAFINE HYDROCHLORIDE 250 MG/1
250 TABLET ORAL DAILY
Qty: 90 TABLET | Refills: 0 | Status: SHIPPED | OUTPATIENT
Start: 2024-09-30 | End: 2024-12-29

## 2024-09-30 RX ORDER — METHYLPHENIDATE HYDROCHLORIDE 18 MG/1
18 TABLET ORAL EVERY MORNING
COMMUNITY

## 2024-09-30 RX ORDER — LEVONORGESTREL/ETHIN.ESTRADIOL 0.1-0.02MG
TABLET ORAL
COMMUNITY
Start: 2021-10-24 | End: 2024-09-30 | Stop reason: ALTCHOICE

## 2024-09-30 RX ORDER — HYDROXYZINE HYDROCHLORIDE 50 MG/1
50 TABLET, FILM COATED ORAL DAILY PRN
COMMUNITY
Start: 2024-09-20

## 2024-09-30 RX ORDER — SERTRALINE HYDROCHLORIDE 100 MG/1
1 TABLET, FILM COATED ORAL
COMMUNITY
Start: 2024-07-10

## 2024-09-30 NOTE — PROGRESS NOTES
"  Assessment & Plan     (B35.1) Onychomycosis  (primary encounter diagnosis)  (Z79.899) Encounter for long-term (current) use of medications  Comment: She has tried topical products, without benefit. Nearly all toenails are thickened and long. Left 4th nail with pincer deformity.  Start oral terbinafine 250 mg x3 months; GoodRx if insurance doesn't cover it.  Liver enzymes were normal in 6/2024. Check 6 weeks after starting terbinafine; lab order placed and discussed with patient today.  Discussed that the current nail does not change with treatment, but that the healthy nail should grow out over the next few months.  Discussed that nail deformity may persist after treatment if fungus has gotten into the germ layer.  Discussed that nail fungus is very common and 100% likely reoccur within 10 years of treatment.  Plan: terbinafine (LAMISIL) 250 MG tablet, Comprehensive metabolic panel    (R45.851) Suicidal ideation  (F33.1) Major depressive disorder, recurrent episode, moderate (H)  (F41.8) Other specified anxiety disorders  Comment: Mood is stable on current medications.  Continue working with psychiatry and counselor.  She will complete a UDS for her  provider today after this appointment.    BMI  Estimated body mass index is 41.94 kg/m  as calculated from the following:    Height as of 6/8/24: 1.803 m (5' 11\").    Weight as of this encounter: 136.4 kg (300 lb 11.2 oz).   Weight management plan: Patient was referred to their PCP to discuss a diet and exercise plan.    See Patient Instructions      Sonja Darden is a 29 year old, presenting for the following health issues:  Nail Problem    Via the Health Maintenance questionnaire, the patient has reported the following services have been completed -Cervical Cancer Screening: [facility] 2024-07-02, this information has been sent to the abstraction team.    History of Present Illness       Reason for visit:  Issue with toenails  Symptom onset:  More than a " "month  Symptoms include:  Thick discolored toenails (had for5 years) but in last few months they have become more sensitive  Symptom intensity:  Mild  Symptom progression:  Staying the same  Had these symptoms before:  No  What makes it worse:  Compression socks, too much pressure on toes  What makes it better:  Staying off my feet, looser socks   She is taking medications regularly.     Some toenails are thickened.  No pincer-nail deformity.  Has tried topical antifungal product, \"like a leave-on oil.\" This was not particularly helpful.    Additional concerns:  Hospitalized overnight in June for SA by medication overdose. Has been working with a counselor weekly and a psychiatrist for medication management, with good results. Reports her mood is stable.    Followed by Sena Burleson CNP, for primary care.      Review of Systems  Constitutional, cardiovascular, pulmonary, musculoskeletal, neuro, skin, and psych systems are negative, except as otherwise noted.      Objective    /80   Pulse 67   Temp 97.9  F (36.6  C) (Temporal)   Wt 136.4 kg (300 lb 11.2 oz)   SpO2 99%   BMI 41.94 kg/m    Body mass index is 41.94 kg/m .  Physical Exam  Vitals and nursing note reviewed.   Constitutional:       General: She is not in acute distress.     Appearance: Normal appearance.   Cardiovascular:      Rate and Rhythm: Normal rate and regular rhythm.      Pulses: Normal pulses.      Heart sounds: Normal heart sounds. No murmur heard.     No friction rub. No gallop.   Pulmonary:      Effort: Pulmonary effort is normal. No respiratory distress.      Breath sounds: Normal breath sounds. No wheezing, rhonchi or rales.   Musculoskeletal:         General: No swelling.   Feet:      Right foot:      Skin integrity: Callus and dry skin present.      Toenail Condition: Right toenails are abnormally thick and long. Fungal disease present.     Left foot:      Skin integrity: Callus and dry skin present.      Toenail Condition: Left " toenails are abnormally thick and long. Fungal disease present.     Comments: Pincer nail deformity of left 4th nail  Skin:     General: Skin is warm and dry.   Neurological:      General: No focal deficit present.      Mental Status: She is alert and oriented to person, place, and time.   Psychiatric:         Mood and Affect: Mood normal.         Behavior: Behavior normal.         Thought Content: Thought content normal.         Judgment: Judgment normal.              Signed Electronically by: LOUANN Solomon CNP

## 2024-12-26 ENCOUNTER — LAB (OUTPATIENT)
Dept: LAB | Facility: CLINIC | Age: 29
End: 2024-12-26
Payer: COMMERCIAL

## 2024-12-26 DIAGNOSIS — E55.9 AVITAMINOSIS D: ICD-10-CM

## 2024-12-26 DIAGNOSIS — F33.1 MAJOR DEPRESSIVE DISORDER, RECURRENT EPISODE, MODERATE (H): Primary | ICD-10-CM

## 2024-12-29 LAB
DEPRECATED CALCIDIOL+CALCIFEROL SERPL-MC: <35 UG/L (ref 20–75)
VITAMIN D2 SERPL-MCNC: <5 UG/L
VITAMIN D3 SERPL-MCNC: 30 UG/L

## 2025-02-20 ENCOUNTER — OFFICE VISIT (OUTPATIENT)
Dept: URGENT CARE | Facility: URGENT CARE | Age: 30
End: 2025-02-20
Payer: COMMERCIAL

## 2025-02-20 ENCOUNTER — ANCILLARY PROCEDURE (OUTPATIENT)
Dept: GENERAL RADIOLOGY | Facility: CLINIC | Age: 30
End: 2025-02-20
Attending: PHYSICIAN ASSISTANT
Payer: COMMERCIAL

## 2025-02-20 VITALS
SYSTOLIC BLOOD PRESSURE: 141 MMHG | RESPIRATION RATE: 18 BRPM | BODY MASS INDEX: 43.1 KG/M2 | OXYGEN SATURATION: 98 % | WEIGHT: 293 LBS | HEART RATE: 83 BPM | TEMPERATURE: 98.1 F | DIASTOLIC BLOOD PRESSURE: 85 MMHG

## 2025-02-20 DIAGNOSIS — M25.562 ACUTE PAIN OF LEFT KNEE: Primary | ICD-10-CM

## 2025-02-20 RX ORDER — IBUPROFEN 800 MG/1
800 TABLET, FILM COATED ORAL EVERY 8 HOURS PRN
Qty: 30 TABLET | Refills: 0 | Status: SHIPPED | OUTPATIENT
Start: 2025-02-20

## 2025-02-20 NOTE — PROGRESS NOTES
SUBJECTIVE:   Adelso Portillo is a 29 year old female presenting with a chief complaint of   Chief Complaint   Patient presents with    Urgent Care     Left knee injury- twisted last night        She is an established patient of [facility].  Patient presents with left knee pain after falling over a baby fence onto a carpeted floor yesterday.  No previous injury.      Treatment:  ice and elevation        Review of Systems    Past Medical History:   Diagnosis Date    Depressive disorder     Knee pain, right 09/19/2018     Family History   Problem Relation Age of Onset    Obesity Mother     Hypertension Mother     Obesity Father      Current Outpatient Medications   Medication Sig Dispense Refill    hydrOXYzine HCl (ATARAX) 50 MG tablet Take 50 mg by mouth daily as needed for anxiety.      levonorgestrel (KYLEENA) 19.5 MG IUD       methylphenidate HCl ER, OSM, (CONCERTA) 18 MG CR tablet Take 18 mg by mouth every morning. (Patient not taking: Reported on 9/30/2024)      sertraline (ZOLOFT) 100 MG tablet Take 1 tablet by mouth daily at 2 pm.      sertraline (ZOLOFT) 50 MG tablet Take 1 tablet by mouth daily at 2 pm.      VITAMIN D PO        Social History     Tobacco Use    Smoking status: Never    Smokeless tobacco: Never   Substance Use Topics    Alcohol use: Yes     Comment: 1 per week       OBJECTIVE  BP (!) 141/85   Pulse 83   Temp 98.1  F (36.7  C) (Tympanic)   Resp 18   Wt (!) 140.2 kg (309 lb)   SpO2 98%   BMI 43.10 kg/m      Physical Exam  Vitals and nursing note reviewed.   Constitutional:       Appearance: Normal appearance. She is obese.   Cardiovascular:      Rate and Rhythm: Normal rate.   Musculoskeletal:      Comments: Left knee without effusion or ecchymosis or erythema.  No tenderness to palpation about the joint lines, but pain to medial posterior with ROM.  Ambulation is antalgic.   Neurological:      General: No focal deficit present.      Mental Status: She is alert.         Labs:  No results  found for this or any previous visit (from the past 24 hours).    X-Ray was done, my findings are: Xrays reviewed by myself and independently interpreted.  Any significant discrepancies with official radiologic read, patient will be notified.      NAD    ASSESSMENT:      ICD-10-CM    1. Acute pain of left knee  M25.562 XR Knee Left 3 Views           Medical Decision Making:    Differential Diagnosis:  MS Injury Pain: sprain, fracture, osteoarthritis, and internal derrangement    Serious Comorbid Conditions:  Adult:   reviewed    PLAN:    Ibuprofen and zanaflex.  Discussed SE of zanaflex.  Ortho referral.      Followup:    If not improving or if condition worsens, follow up with your Primary Care Provider, In 5  day(s) follow up with  Ortho    There are no Patient Instructions on file for this visit.

## 2025-04-14 ENCOUNTER — OFFICE VISIT (OUTPATIENT)
Dept: ORTHOPEDICS | Facility: CLINIC | Age: 30
End: 2025-04-14
Attending: PHYSICIAN ASSISTANT
Payer: MEDICAID

## 2025-04-14 VITALS — BODY MASS INDEX: 41.02 KG/M2 | HEIGHT: 71 IN | WEIGHT: 293 LBS

## 2025-04-14 DIAGNOSIS — S83.8X2A SPRAIN OF MEDIAL MENISCUS OF LEFT KNEE, INITIAL ENCOUNTER: ICD-10-CM

## 2025-04-14 DIAGNOSIS — M25.562 ACUTE PAIN OF LEFT KNEE: ICD-10-CM

## 2025-04-14 DIAGNOSIS — G57.12 MERALGIA PARESTHETICA OF LEFT SIDE: ICD-10-CM

## 2025-04-14 DIAGNOSIS — S86.912A STRAIN OF LEFT KNEE, INITIAL ENCOUNTER: Primary | ICD-10-CM

## 2025-04-14 PROCEDURE — 99203 OFFICE O/P NEW LOW 30 MIN: CPT | Performed by: STUDENT IN AN ORGANIZED HEALTH CARE EDUCATION/TRAINING PROGRAM

## 2025-04-14 NOTE — LETTER
4/14/2025      Adelso Portillo  [address] [place] [address]      Dear Colleague,    Thank you for referring your patient, Adelso Portillo, to the [facility]. Please see a copy of my visit note below.    SPORTS MEDICINE CLINIC NEW PATIENT VISIT    REFERRAL SOURCE: BRIAN Dominguez    PATIENT'S GOAL FOR APPOINTMENT: make sure nothing else is wrong and what she can be doing to help with the pain     HISTORY OF PRESENT ILLNESS  Adelso Portillo is a 29 year old female presenting as a new patient with  left knee pain    When did problem start?/Trauma associated with onset?:  Onset: 2/19/2025, 6.5 weeks out   DAVID: Tripped over a baby gate   Hx of left knee injury 10 years ago - tripped and had stitches in her knee     Location & description of pain:  Posterior knee pain     Exacerbating factors:   Knee flexion, putting on socks, resistance     Remitting factors: rest    Previous Treatments:  -Medications: muscle relaxer's, ibuprofen   -Rehabilitation: None   -Durable Medical Equipment: None   -Injections: None   -Modalities: None   -Other Providers seen:  provider     Sports, Hobbies, Employment:  Unemployed     Average hours of sleep per night: 9 hours     Average minutes of exercise per day: Daily 0    Area of Problem  4/14/2025   Date 2 Date 3 Date 4 Date 5   Function Ability in last week - % of Baseline (0 is worst & 100 is best) *       Sport/Activity Ability in last week - % of Baseline (0 is worst & 100 is best) *       Pain Level in the last week (0 is best & 10 is worst) 3         Additional Information of consideration:  -Poor Balance? None   -Numbness/paresthesias in extremities? Intermittent Tingling in her left lateral thigh     MEDICATIONS    Current Outpatient Medications:      hydrOXYzine HCl (ATARAX) 50 MG tablet, Take 50 mg by mouth daily as needed for anxiety., Disp: , Rfl:      ibuprofen (ADVIL/MOTRIN) 800 MG tablet, Take 1 tablet (800 mg) by mouth  "every 8 hours as needed for moderate pain., Disp: 30 tablet, Rfl: 0     levonorgestrel (KYLEENA) 19.5 MG IUD, , Disp: , Rfl:      sertraline (ZOLOFT) 100 MG tablet, Take 1 tablet by mouth daily at 2 pm., Disp: , Rfl:      VITAMIN D PO, , Disp: , Rfl:      methylphenidate HCl ER, OSM, (CONCERTA) 18 MG CR tablet, Take 18 mg by mouth every morning. (Patient not taking: Reported on 4/14/2025), Disp: , Rfl:      sertraline (ZOLOFT) 50 MG tablet, Take 1 tablet by mouth daily at 2 pm., Disp: , Rfl:      tiZANidine (ZANAFLEX) 4 MG tablet, Take 1 tablet (4 mg) by mouth 3 times daily., Disp: 25 tablet, Rfl: 0    ALLERGIES  No Known Allergies    PAST MEDICAL HISTORY  Past Medical History:   Diagnosis Date     Depressive disorder      Knee pain, right 09/19/2018       PAST SURGICAL HISTORY  No past surgical history on file.    SOCIAL HISTORY  Social History     Tobacco Use     Smoking status: Never     Smokeless tobacco: Never   Vaping Use     Vaping status: Never Used   Substance Use Topics     Alcohol use: Yes     Comment: 1 per week     Drug use: Never       FAMILY HISTORY  Family History   Problem Relation Age of Onset     Obesity Mother      Hypertension Mother      Obesity Father        REVIEW OF SYSTEMS  Complete 12 system Review of Systems performed and was negative except for HPI.    VITALS  Vitals:    04/14/25 1419   Weight: (!) 140.2 kg (309 lb)   Height: 1.803 m (5' 11\")       PHYSICAL EXAMINATION   General: Age appropriate appearing, no acute distress  HEENT: normocephalic, atraumatic, sclera non-icteric  Skin: No open skin lesion noted in visible areas.  Respiratory: Non labored breathing, No wheezes.  Cardiac/Vessels: No edema, cyanosis, clubbing noted in all extremities.  Lymph: No palpable lymph node swelling noted around the affected area.  Mental: There was no signs of aberrant behaviors noted. Patient was pleasant throughout the encounter.    Functional Movements: Non-antalgic gait appreciated. Able to " "complete squat, balance on each leg, walk on heels and walk on toes without difficulty.     LEFT KNEE:   Inspection: No deformities, atrophy, effusion, warmth   Palpation: No TTP throughout medial/lateral joint lines, QT, PT, popliteal fossa  Range of Motion (Estimated, Active unless otherwise noted):Flexion 150 degrees, extension to neutral.     Special Testing:   Testing: Positive medial Anjelica's, The following special tests were negative: Valgus stress test, Varus stress test, Lachman's test, Anterior drawer test, Posterior drawer test, Anjelica's test, Thessaly test,  Patellofemoral grind test    IMAGING STUDIES:  2/20/2025 Left knee radiographs: \"IMPRESSION: Normal joint spaces and alignment. No fracture or joint effusion. \"    I personally reviewed these images and agree with the radiology report and shared the findings with the patient.    IMPRESSION  Adelso Portillo is a very pleasant 29 year old female who is presenting today with left posterior knee pain provoked by deep knee flexion and positive Medial McMurrays after tripping over a baby gate on 2/19/2025, with no clinical or ligamentous instability and no radiographic evidence of fracture.  Symptoms are most consistent with left knee medial meniscus strain.  She also has intermittent lateral thigh paresthesias consistent with Meralgia paresthetica, which may be due to stretch injury of the lateral femoral cutaneous nerve.    PLAN  The following was discussed with the patient:  Activity Modification: As tolerated  Imaging/Tests: X-rays reviewed as noted above. If symptoms fail to improve, consider MRI in the future  Rehabilitation: physical therapy recommended, referral placed  Orthotics/Bracing: None recommended, please be sure to wear loose clothing  Medication: -Non-prescription topical and/or oral analgesics may be used as needed  Interventions: None recommended at this time.  For the Lateral Femoral Cutaneus Neuralgia (AKA Meralgia paresthetica); " if symptoms persist, could consider nerve block  Follow-up Plan: As needed (after 6-8 weeks of PT if symptoms persists)  Resources Provided: Written and verbal information detailing above findings and plan provided including after visit summary.    They were encouraged to message me on [employer] whenever they needed.    The patient was in agreement with this plan. All questions were answered to the best of my ability.    Total time (face-to-face and non-face-to-face) spent on today's visit was 25 minutes. This included preparation for the visit (i.e. reviewing test results), performance of a medically appropriate history and examination, placing orders for medications, tests or other procedures, and discussing the plan of care with the patient. This time is exclusive of procedures performed and time spent teaching.    Jeferson Hawk MD, [facility]  Sports Medicine Attending Physician  Department of Physical Medicine & Rehabilitation          Again, thank you for allowing me to participate in the care of your patient.        Sincerely,        Jeferson Hawk MD    Electronically signed

## 2025-04-14 NOTE — PATIENT INSTRUCTIONS
Adelso Portillo, It was nice to see you in our office today.      DIAGNOSIS:   1. Strain of left knee, initial encounter    2. Acute pain of left knee    3. Sprain of medial meniscus of left knee, initial encounter    4. Meralgia paresthetica of left side      INSTRUCTIONS FOR FOLLOW-UP CARE:  Activity Modification: As tolerated  Imaging/Tests: X-rays reviewed.  If symptoms fail to improve, consider MRI in the future  Rehabilitation: physical therapy recommended, referral placed  Orthotics/Bracing: None recommended, please be sure to wear loose clothing  Medication: --Topical Medication Options:  May use topical Voltaren gel up to 4 times daily as needed for pain, if symptoms persist,  May use topical Aspercreme up to 4 times daily as needed for pain, if symptoms persist  May alternate with topical Verasome gel up to 4 times daily as needed for pain: this may be purchased at: https://www.[employer]/verasome with using patient pricing code sofatutor  For night time pain, consider nightly Salonpas patches nightly (on for 12 hours and off for 12 hours)                    --Oral medication options: Tylenol 500-1000mg (up to three times per day) and ibuprofen 600mg (up to three times per day) as needed for pain and swelling. Always take ibuprofen with food.     Interventions: None recommended at this time.  For the Lateral Femoral Cutaneus Neuralgia (AKA Meralgia paresthetica); if symptoms persist, could consider nerve block  Follow-up Plan: As needed (after 6-8 weeks of PT if symptoms persists)    CLINIC LOCATIONS:     [facility]:  [phone] [address], [address] MYCHART HELP LINE: [phone]   [place] [address] TRIAGE LINE: [phone]   (Monday & Friday) APPOINTMENTS: [phone]    RADIOLOGY: [phone]   [place] [facility] [phone] [address] PHYSICAL & OCCUPATIONAL THERAPY: [phone]*   [place] [address] BILLING QUESTIONS: [phone]    (Tuesday & Thursday) FAX: [phone]       *Therapy locations that offer Saturday options: [place], [place], [place]    Thank you for choosing [facility]!    If you have any questions, please do not hesitate to reach out on [employer] or Call [phone] and ask for my team.    Jeferson Hawk MD, [facility] Orthopedics and Sports Medicine

## 2025-04-14 NOTE — PROGRESS NOTES
SPORTS MEDICINE CLINIC NEW PATIENT VISIT    REFERRAL SOURCE: BRIAN Dominguez    PATIENT'S GOAL FOR APPOINTMENT: make sure nothing else is wrong and what she can be doing to help with the pain     HISTORY OF PRESENT ILLNESS  Adelso Portillo is a 29 year old female presenting as a new patient with  left knee pain    When did problem start?/Trauma associated with onset?:  Onset: 2/19/2025, 6.5 weeks out   DAVID: Tripped over a baby gate   Hx of left knee injury 10 years ago - tripped and had stitches in her knee     Location & description of pain:  Posterior knee pain     Exacerbating factors:   Knee flexion, putting on socks, resistance     Remitting factors: rest    Previous Treatments:  -Medications: muscle relaxer's, ibuprofen   -Rehabilitation: None   -Durable Medical Equipment: None   -Injections: None   -Modalities: None   -Other Providers seen:  provider     Sports, Hobbies, Employment:  Unemployed     Average hours of sleep per night: 9 hours     Average minutes of exercise per day: Daily 0    Area of Problem  4/14/2025   Date 2 Date 3 Date 4 Date 5   Function Ability in last week - % of Baseline (0 is worst & 100 is best) *       Sport/Activity Ability in last week - % of Baseline (0 is worst & 100 is best) *       Pain Level in the last week (0 is best & 10 is worst) 3         Additional Information of consideration:  -Poor Balance? None   -Numbness/paresthesias in extremities? Intermittent Tingling in her left lateral thigh     MEDICATIONS    Current Outpatient Medications:     hydrOXYzine HCl (ATARAX) 50 MG tablet, Take 50 mg by mouth daily as needed for anxiety., Disp: , Rfl:     ibuprofen (ADVIL/MOTRIN) 800 MG tablet, Take 1 tablet (800 mg) by mouth every 8 hours as needed for moderate pain., Disp: 30 tablet, Rfl: 0    levonorgestrel (KYLEENA) 19.5 MG IUD, , Disp: , Rfl:     sertraline (ZOLOFT) 100 MG tablet, Take 1 tablet by mouth daily at 2 pm., Disp: , Rfl:     VITAMIN D PO, , Disp: , Rfl:  "    methylphenidate HCl ER, OSM, (CONCERTA) 18 MG CR tablet, Take 18 mg by mouth every morning. (Patient not taking: Reported on 4/14/2025), Disp: , Rfl:     sertraline (ZOLOFT) 50 MG tablet, Take 1 tablet by mouth daily at 2 pm., Disp: , Rfl:     tiZANidine (ZANAFLEX) 4 MG tablet, Take 1 tablet (4 mg) by mouth 3 times daily., Disp: 25 tablet, Rfl: 0    ALLERGIES  No Known Allergies    PAST MEDICAL HISTORY  Past Medical History:   Diagnosis Date    Depressive disorder     Knee pain, right 09/19/2018       PAST SURGICAL HISTORY  No past surgical history on file.    SOCIAL HISTORY  Social History     Tobacco Use    Smoking status: Never    Smokeless tobacco: Never   Vaping Use    Vaping status: Never Used   Substance Use Topics    Alcohol use: Yes     Comment: 1 per week    Drug use: Never       FAMILY HISTORY  Family History   Problem Relation Age of Onset    Obesity Mother     Hypertension Mother     Obesity Father        REVIEW OF SYSTEMS  Complete 12 system Review of Systems performed and was negative except for HPI.    VITALS  Vitals:    04/14/25 1419   Weight: (!) 140.2 kg (309 lb)   Height: 1.803 m (5' 11\")       PHYSICAL EXAMINATION   General: Age appropriate appearing, no acute distress  HEENT: normocephalic, atraumatic, sclera non-icteric  Skin: No open skin lesion noted in visible areas.  Respiratory: Non labored breathing, No wheezes.  Cardiac/Vessels: No edema, cyanosis, clubbing noted in all extremities.  Lymph: No palpable lymph node swelling noted around the affected area.  Mental: There was no signs of aberrant behaviors noted. Patient was pleasant throughout the encounter.    Functional Movements: Non-antalgic gait appreciated. Able to complete squat, balance on each leg, walk on heels and walk on toes without difficulty.     LEFT KNEE:   Inspection: No deformities, atrophy, effusion, warmth   Palpation: No TTP throughout medial/lateral joint lines, QT, PT, popliteal fossa  Range of Motion " "(Estimated, Active unless otherwise noted):Flexion 150 degrees, extension to neutral.     Special Testing:   Testing: Positive medial Anjelica's, The following special tests were negative: Valgus stress test, Varus stress test, Lachman's test, Anterior drawer test, Posterior drawer test, Anjelica's test, Thessaly test,  Patellofemoral grind test    IMAGING STUDIES:  2/20/2025 Left knee radiographs: \"IMPRESSION: Normal joint spaces and alignment. No fracture or joint effusion. \"    I personally reviewed these images and agree with the radiology report and shared the findings with the patient.    IMPRESSION  Adelso Portillo is a very pleasant 29 year old female who is presenting today with left posterior knee pain provoked by deep knee flexion and positive Medial McMurrays after tripping over a baby gate on 2/19/2025, with no clinical or ligamentous instability and no radiographic evidence of fracture.  Symptoms are most consistent with left knee medial meniscus strain.  She also has intermittent lateral thigh paresthesias consistent with Meralgia paresthetica, which may be due to stretch injury of the lateral femoral cutaneous nerve.    PLAN  The following was discussed with the patient:  Activity Modification: As tolerated  Imaging/Tests: X-rays reviewed as noted above. If symptoms fail to improve, consider MRI in the future  Rehabilitation: physical therapy recommended, referral placed  Orthotics/Bracing: None recommended, please be sure to wear loose clothing  Medication: -Non-prescription topical and/or oral analgesics may be used as needed  Interventions: None recommended at this time.  For the Lateral Femoral Cutaneus Neuralgia (AKA Meralgia paresthetica); if symptoms persist, could consider nerve block  Follow-up Plan: As needed (after 6-8 weeks of PT if symptoms persists)  Resources Provided: Written and verbal information detailing above findings and plan provided including after visit summary.    They were " encouraged to message me on [employer] whenever they needed.    The patient was in agreement with this plan. All questions were answered to the best of my ability.    Total time (face-to-face and non-face-to-face) spent on today's visit was 25 minutes. This included preparation for the visit (i.e. reviewing test results), performance of a medically appropriate history and examination, placing orders for medications, tests or other procedures, and discussing the plan of care with the patient. This time is exclusive of procedures performed and time spent teaching.    Jeferson Hawk MD, [facility]  Sports Medicine Attending Physician  Department of Physical Medicine & Rehabilitation

## 2025-04-16 ENCOUNTER — THERAPY VISIT (OUTPATIENT)
Dept: PHYSICAL THERAPY | Facility: CLINIC | Age: 30
End: 2025-04-16
Attending: STUDENT IN AN ORGANIZED HEALTH CARE EDUCATION/TRAINING PROGRAM
Payer: MEDICAID

## 2025-04-16 DIAGNOSIS — M25.562 ACUTE PAIN OF LEFT KNEE: ICD-10-CM

## 2025-04-16 DIAGNOSIS — S83.8X2A SPRAIN OF MEDIAL MENISCUS OF LEFT KNEE, INITIAL ENCOUNTER: ICD-10-CM

## 2025-04-16 DIAGNOSIS — S86.912A STRAIN OF LEFT KNEE, INITIAL ENCOUNTER: ICD-10-CM

## 2025-04-16 DIAGNOSIS — G57.12 MERALGIA PARESTHETICA OF LEFT SIDE: ICD-10-CM

## 2025-04-16 PROCEDURE — 97161 PT EVAL LOW COMPLEX 20 MIN: CPT | Mod: GP

## 2025-04-16 PROCEDURE — 97110 THERAPEUTIC EXERCISES: CPT | Mod: GP

## 2025-04-16 ASSESSMENT — ACTIVITIES OF DAILY LIVING (ADL)
HOW_WOULD_YOU_RATE_THE_OVERALL_FUNCTION_OF_YOUR_KNEE_DURING_YOUR_USUAL_DAILY_ACTIVITIES?: NEARLY NORMAL
SQUAT: ACTIVITY IS MINIMALLY DIFFICULT
RAW_SCORE: 61
GO UP STAIRS: ACTIVITY IS NOT DIFFICULT
LIMPING: I HAVE THE SYMPTOM BUT IT DOES NOT AFFECT MY ACTIVITY
SWELLING: I DO NOT HAVE THE SYMPTOM
STAND: ACTIVITY IS NOT DIFFICULT
PAIN: THE SYMPTOM AFFECTS MY ACTIVITY SLIGHTLY
STIFFNESS: I HAVE THE SYMPTOM BUT IT DOES NOT AFFECT MY ACTIVITY
WEAKNESS: I DO NOT HAVE THE SYMPTOM
HOW_WOULD_YOU_RATE_THE_CURRENT_FUNCTION_OF_YOUR_KNEE_DURING_YOUR_USUAL_DAILY_ACTIVITIES_ON_A_SCALE_FROM_0_TO_100_WITH_100_BEING_YOUR_LEVEL_OF_KNEE_FUNCTION_PRIOR_TO_YOUR_INJURY_AND_0_BEING_THE_INABILITY_TO_PERFORM_ANY_OF_YOUR_USUAL_DAILY_ACTIVITIES?: 90
GO UP STAIRS: ACTIVITY IS NOT DIFFICULT
STIFFNESS: I HAVE THE SYMPTOM BUT IT DOES NOT AFFECT MY ACTIVITY
GO DOWN STAIRS: ACTIVITY IS NOT DIFFICULT
SQUAT: ACTIVITY IS MINIMALLY DIFFICULT
PLEASE_INDICATE_YOR_PRIMARY_REASON_FOR_REFERRAL_TO_THERAPY:: KNEE
SIT WITH YOUR KNEE BENT: ACTIVITY IS NOT DIFFICULT
WALK: ACTIVITY IS NOT DIFFICULT
KNEE_ACTIVITY_OF_DAILY_LIVING_SCORE: 87.14
SWELLING: I DO NOT HAVE THE SYMPTOM
KNEEL ON THE FRONT OF YOUR KNEE: ACTIVITY IS FAIRLY DIFFICULT
GIVING WAY, BUCKLING OR SHIFTING OF KNEE: I DO NOT HAVE THE SYMPTOM
KNEE_ACTIVITY_OF_DAILY_LIVING_SUM: 61
STAND: ACTIVITY IS NOT DIFFICULT
GIVING WAY, BUCKLING OR SHIFTING OF KNEE: I DO NOT HAVE THE SYMPTOM
GO DOWN STAIRS: ACTIVITY IS NOT DIFFICULT
RISE FROM A CHAIR: ACTIVITY IS MINIMALLY DIFFICULT
HOW_WOULD_YOU_RATE_THE_OVERALL_FUNCTION_OF_YOUR_KNEE_DURING_YOUR_USUAL_DAILY_ACTIVITIES?: NEARLY NORMAL
PAIN: THE SYMPTOM AFFECTS MY ACTIVITY SLIGHTLY
HOW_WOULD_YOU_RATE_THE_CURRENT_FUNCTION_OF_YOUR_KNEE_DURING_YOUR_USUAL_DAILY_ACTIVITIES_ON_A_SCALE_FROM_0_TO_100_WITH_100_BEING_YOUR_LEVEL_OF_KNEE_FUNCTION_PRIOR_TO_YOUR_INJURY_AND_0_BEING_THE_INABILITY_TO_PERFORM_ANY_OF_YOUR_USUAL_DAILY_ACTIVITIES?: 90
WEAKNESS: I DO NOT HAVE THE SYMPTOM
SIT WITH YOUR KNEE BENT: ACTIVITY IS NOT DIFFICULT
LIMPING: I HAVE THE SYMPTOM BUT IT DOES NOT AFFECT MY ACTIVITY
AS_A_RESULT_OF_YOUR_KNEE_INJURY,_HOW_WOULD_YOU_RATE_YOUR_CURRENT_LEVEL_OF_DAILY_ACTIVITY?: NEARLY NORMAL
KNEEL ON THE FRONT OF YOUR KNEE: ACTIVITY IS FAIRLY DIFFICULT
AS_A_RESULT_OF_YOUR_KNEE_INJURY,_HOW_WOULD_YOU_RATE_YOUR_CURRENT_LEVEL_OF_DAILY_ACTIVITY?: NEARLY NORMAL
WALK: ACTIVITY IS NOT DIFFICULT
RISE FROM A CHAIR: ACTIVITY IS MINIMALLY DIFFICULT

## 2025-04-16 NOTE — PROGRESS NOTES
PHYSICAL THERAPY EVALUATION  Type of Visit: Evaluation        Fall Risk Screen:  Have you fallen 2 or more times in the past year?: No  Have you fallen and had an injury in the past year?: Yes  Is patient receiving Physical Therapy Services?: Yes  Fall screen comments: seeing PT for injury after a fall over pet gate    Subjective         Presenting condition or subjective complaint: left knee pain after fall    Patient presents to physical therapy for evaluation. Patient reports that at the end of Feb tripped over a baby gate, fell onto the left knee. Getting better but still having pain at end range knee flexion, and kneeling on the left knee. Reports it has been better than the first few days after falling, but has plateaued since then. She reports that she is trying to redo her room and finds it difficult to paint the bottom part of the walls. Some hx of right patellar dislocations in high school.    Date of onset: 04/14/25 (referral date)    Relevant medical history: Depression; Mental Illness; Migraines or headaches; Osteoarthritis; Overweight   Past Medical History:   Diagnosis Date    Depressive disorder     Knee pain, right 09/19/2018      Dates & types of surgery: wisdom teeth removal, 2016  No past surgical history on file.   Prior diagnostic imaging/testing results: X-ray     Prior therapy history for the same diagnosis, illness or injury: No      Prior Level of Function  Transfers: Independent  Ambulation: Independent  ADL: Independent  IADL:     Living Environment  Social support: Alone   Type of home: Apartment/condo   Stairs to enter the home: No       Ramp: No   Stairs inside the home: No       Help at home: None  Equipment owned:       Employment: No    Hobbies/Interests: walks, reading,knitting, cooking    Patient goals for therapy: paint my walls, the bottom is difficult    Pain assessment: Pain denied     Objective   KNEE EVALUATION  PAIN: Pain Level at Rest: 0/10  Pain Level with Use: 4/10  Pain  Location: knee and left over right  Pain Quality: Sharp  Pain Frequency: a few times per week  Pain is Worst: nighttime  Pain is Exacerbated By: end range flexion, kneeling on knee  Pain is Relieved By: rest  Pain Progression: Unchanged    INTEGUMENTARY (edema, incisions):   POSTURE: Standing Posture: Rounded shoulders, Forward head, Thoracic kyphosis increased, Genu recurvatum  Sitting Posture: Rounded shoulders, Forward head, Thoracic kyphosis increased  GAIT:  Weightbearing Status: WBAT  Assistive Device(s): None  Gait Deviations: Evie decreased  Slightly antalgic favoring L leg  BALANCE/PROPRIOCEPTION:   WEIGHTBEARING ALIGNMENT:   NON-WEIGHTBEARING ALIGNMENT:   ROM: PROM WNL  Pain end range flexion bilaterally, pain at end range extension on L knee; no pain at end range extension on R knee    STRENGTH:   Pain: - none + mild ++ moderate +++ severe  Strength Scale: 0-5/5 Left Right   Knee Flexion 4+, + (mild) 4+, - (none)   Knee Extension 4+, - (none) 4+, - (none)   Hip Flexion 4+, none 4+, none   Hip IR 4+, none 4+, none   Hip ER 4+, none 4+, none   Ankle DF 5, none 5, none     FLEXIBILITY:   SPECIAL TESTS:    Left Right   Apley's (Meniscus)     Anjelica's (Meniscus)     Berta's (ITB/TFL)     Patellar Apprehension Test Negative  Negative    Patella Tracking     Ligamentous Stability     Anterior Drawer (ACL) Negative Negative   Posterior Drawer (PCL) Negative Negative,  pain with test   Prone Dial Test at 30 Deg and 90 Deg (PCL/PLC)     Valgus Stress Testing at 0 Deg and 30 Deg Negative Negative   Varus Stress Testing at 0 Deg and 30 Deg  Negative Negative     FUNCTIONAL TESTS: Double Leg Squat: Anterior knee translation, Knee valgus, Hip internal rotation, Increased trunk lean, Improper use of glutes/hips, and Able to perform full deep squat without pain  PALPATION:   + Tenderness At Location Left Right   Medial Joint Line + -   Lateral Joint Line - -   Patellar Tendon + -   IT Band - -   Popliteal + -    Biceps Femoris     Semitendinosis + -   Semimembranosis + -   Glut Medius     Patellar Medial - -   Patellar Lateral - -   Patellar Superior - -   Patellar Inferior  - -     JOINT MOBILITY: WNL    Assessment & Plan   CLINICAL IMPRESSIONS  Medical Diagnosis: acute pain of left knee, strain of left knee, initial encounter, sprain of medial meniscus of left knee, meralgia parasthetica of left side    Treatment Diagnosis: left knee pain   Impression/Assessment: Patient is a 29 year old female with left knee pain complaints.  The following significant findings have been identified: Pain, Decreased ROM/flexibility, Decreased strength, Decreased proprioception, Impaired gait, Impaired muscle performance, Decreased activity tolerance, and Impaired posture. These impairments interfere with their ability to perform self care tasks, work tasks, recreational activities, household chores, household mobility, and community mobility as compared to previous level of function.     Clinical Decision Making (Complexity):  Clinical Presentation: Stable/Uncomplicated  Clinical Presentation Rationale: based on medical and personal factors listed in PT evaluation  Clinical Decision Making (Complexity): Low complexity    PLAN OF CARE  Treatment Interventions:  Interventions: Gait Training, Manual Therapy, Neuromuscular Re-education, Therapeutic Activity, Therapeutic Exercise, Self-Care/Home Management    Long Term Goals     PT Goal 1  Goal Identifier: LTG1  Goal Description: Patient will be able to kneel for at least 15 minutes without pain greater than 2/10 in knee.  Rationale: to maximize safety and independence within the home;to maximize safety and independence with performance of ADLs and functional tasks;to maximize safety and independence with self cares  Target Date: 07/14/25  PT Goal 2  Goal Identifier: LTG2  Goal Description: Patient will report ability to walk for at least 30 minutes without pain or instability in the left  knee.  Rationale: to maximize safety and independence with performance of ADLs and functional tasks;to maximize safety and independence within the home;to maximize safety and independence within the community;to maximize safety and independence with transportation;to maximize safety and independence with self cares  Target Date: 07/14/25      Frequency of Treatment: 1x every other week for 12 weeks  Duration of Treatment: 12 weeks    Recommended Referrals to Other Professionals:   Education Assessment:   Learner/Method: Patient    Risks and benefits of evaluation/treatment have been explained.   Patient/Family/caregiver agrees with Plan of Care.     Evaluation Time:     PT Eval, Low Complexity Minutes (91287): 20       Signing Clinician: Juventino Salazar PT        [facility]                                                                                   OUTPATIENT PHYSICAL THERAPY      PLAN OF TREATMENT FOR OUTPATIENT REHABILITATION   Patient's Last Name, First Name, PANCHOMARK CevallosrajatAdelso DELEON YOB: 1995   Provider's Name   [facility]   Medical Record No.  [id number]     Onset Date: 04/14/25 (referral date)  Start of Care Date: 04/16/25     Medical Diagnosis:  acute pain of left knee, strain of left knee, initial encounter, sprain of medial meniscus of left knee, meralgia parasthetica of left side      PT Treatment Diagnosis:  left knee pain Plan of Treatment  Frequency/Duration: 1x every other week for 12 weeks/ 12 weeks    Certification date from 04/16/25 to 07/14/25         See note for plan of treatment details and functional goals     Juventino Salazar, PT                         I CERTIFY THE NEED FOR THESE SERVICES FURNISHED UNDER        THIS PLAN OF TREATMENT AND WHILE UNDER MY CARE     (Physician attestation of this document indicates review and certification of the therapy plan).              Referring Provider:  Jeferson PEREZ  Kenn    Initial Assessment  See Epic Evaluation- Start of Care Date: 04/16/25

## 2025-05-01 ENCOUNTER — THERAPY VISIT (OUTPATIENT)
Dept: PHYSICAL THERAPY | Facility: CLINIC | Age: 30
End: 2025-05-01
Payer: MEDICAID

## 2025-05-01 DIAGNOSIS — G57.12 MERALGIA PARESTHETICA OF LEFT SIDE: ICD-10-CM

## 2025-05-01 DIAGNOSIS — S86.912A STRAIN OF LEFT KNEE, INITIAL ENCOUNTER: ICD-10-CM

## 2025-05-01 DIAGNOSIS — M25.562 ACUTE PAIN OF LEFT KNEE: Primary | ICD-10-CM

## 2025-05-01 DIAGNOSIS — S83.8X2A SPRAIN OF MEDIAL MENISCUS OF LEFT KNEE, INITIAL ENCOUNTER: ICD-10-CM

## 2025-05-15 ENCOUNTER — THERAPY VISIT (OUTPATIENT)
Dept: PHYSICAL THERAPY | Facility: CLINIC | Age: 30
End: 2025-05-15
Payer: MEDICAID

## 2025-05-15 DIAGNOSIS — S86.912A STRAIN OF LEFT KNEE, INITIAL ENCOUNTER: ICD-10-CM

## 2025-05-15 DIAGNOSIS — M25.562 ACUTE PAIN OF LEFT KNEE: Primary | ICD-10-CM

## 2025-05-15 DIAGNOSIS — S83.8X2A SPRAIN OF MEDIAL MENISCUS OF LEFT KNEE, INITIAL ENCOUNTER: ICD-10-CM

## 2025-05-15 DIAGNOSIS — G57.12 MERALGIA PARESTHETICA OF LEFT SIDE: ICD-10-CM

## 2025-05-29 ENCOUNTER — THERAPY VISIT (OUTPATIENT)
Dept: PHYSICAL THERAPY | Facility: CLINIC | Age: 30
End: 2025-05-29
Payer: MEDICAID

## 2025-05-29 DIAGNOSIS — M25.562 ACUTE PAIN OF LEFT KNEE: Primary | ICD-10-CM

## 2025-05-29 DIAGNOSIS — G57.12 MERALGIA PARESTHETICA OF LEFT SIDE: ICD-10-CM

## 2025-05-29 DIAGNOSIS — S83.8X2A SPRAIN OF MEDIAL MENISCUS OF LEFT KNEE, INITIAL ENCOUNTER: ICD-10-CM

## 2025-05-29 DIAGNOSIS — S86.912A STRAIN OF LEFT KNEE, INITIAL ENCOUNTER: ICD-10-CM

## 2025-05-29 ASSESSMENT — ACTIVITIES OF DAILY LIVING (ADL)
WEAKNESS: I DO NOT HAVE THE SYMPTOM
KNEE_ACTIVITY_OF_DAILY_LIVING_SUM: 68
GIVING WAY, BUCKLING OR SHIFTING OF KNEE: I DO NOT HAVE THE SYMPTOM
GO DOWN STAIRS: ACTIVITY IS NOT DIFFICULT
WALK: ACTIVITY IS NOT DIFFICULT
GO UP STAIRS: ACTIVITY IS NOT DIFFICULT
KNEE_ACTIVITY_OF_DAILY_LIVING_SCORE: 97.14
PAIN: I DO NOT HAVE THE SYMPTOM
RAW_SCORE: 68
HOW_WOULD_YOU_RATE_THE_CURRENT_FUNCTION_OF_YOUR_KNEE_DURING_YOUR_USUAL_DAILY_ACTIVITIES_ON_A_SCALE_FROM_0_TO_100_WITH_100_BEING_YOUR_LEVEL_OF_KNEE_FUNCTION_PRIOR_TO_YOUR_INJURY_AND_0_BEING_THE_INABILITY_TO_PERFORM_ANY_OF_YOUR_USUAL_DAILY_ACTIVITIES?: 95
SQUAT: ACTIVITY IS NOT DIFFICULT
RISE FROM A CHAIR: ACTIVITY IS NOT DIFFICULT
STAND: ACTIVITY IS NOT DIFFICULT
SWELLING: I DO NOT HAVE THE SYMPTOM
LIMPING: I DO NOT HAVE THE SYMPTOM
HOW_WOULD_YOU_RATE_THE_OVERALL_FUNCTION_OF_YOUR_KNEE_DURING_YOUR_USUAL_DAILY_ACTIVITIES?: NORMAL
AS_A_RESULT_OF_YOUR_KNEE_INJURY,_HOW_WOULD_YOU_RATE_YOUR_CURRENT_LEVEL_OF_DAILY_ACTIVITY?: NORMAL
KNEEL ON THE FRONT OF YOUR KNEE: ACTIVITY IS MINIMALLY DIFFICULT
STIFFNESS: I HAVE THE SYMPTOM BUT IT DOES NOT AFFECT MY ACTIVITY
SIT WITH YOUR KNEE BENT: ACTIVITY IS NOT DIFFICULT

## 2025-05-29 NOTE — PROGRESS NOTES
Pt returns for 4th PT session for L knee pain. Pt reports that she feels like she is able to do 95% of her daily activities without pain. Pt had met initial 2 goals (kneeling and walking). PT added 1 more goal for HEP IND to ensure pt is able to maintain gains and manage sxs independently in order to decrease risk of future pain. Pt would continue to benefit from skilled PT to address any further deficits and improve overall function.    05/29/25 0500   Appointment Info   Signing clinician's name / credentials Elena Bustillo PT, DPT   Total/Authorized Visits Eval & treat   Visits Used 4   Medical Diagnosis acute pain of left knee, strain of left knee, initial encounter, sprain of medial meniscus of left knee, meralgia parasthetica of left side   PT Tx Diagnosis left knee pain   Progress Note/Certification   Start of Care Date 04/16/25   Onset of illness/injury or Date of Surgery 04/14/25  (referral date)   Therapy Frequency 1x every other week for 12 weeks   Predicted Duration 12 weeks   Certification date from 04/16/25   Certification date to 07/14/25   Progress Note Due Date 07/14/25   Progress Note Completed Date 04/16/25   GOALS   PT Goals 2;3   PT Goal 1   Goal Identifier [email or web address]   Goal Description Patient will be able to kneel for at least 15 minutes without pain greater than 2/10 in knee.   Rationale to maximize safety and independence within the home;to maximize safety and independence with performance of ADLs and functional tasks;to maximize safety and independence with self cares   Target Date 07/14/25   Date Met 05/29/25   PT Goal 2   Goal Identifier [email or web address]   Goal Description Patient will report ability to walk for at least 30 minutes without pain or instability in the left knee.   Rationale to maximize safety and independence with performance of ADLs and functional tasks;to maximize safety and independence within the home;to maximize safety and independence within the community;to maximize safety and  independence with transportation;to maximize safety and independence with self cares   Target Date 07/14/25   Date Met 05/15/25   PT Goal 3   Goal Identifier NEW: HEP IND   Goal Description Pt will demonstrate IND symptoms management as demonstrated by not needing to return to PT for at least 2 weeks in order to decrease risk of future pain   Rationale to maximize safety and independence with self cares   Target Date 06/26/25   Subjective Report   Subjective Report PT reports that she continues to get discomfort when kneeling but walking does not seem to be an issue. Pt reports that she did not get a big chance to do exercises.   Objective Measures   Objective Measures Objective Measure 1   Objective Measure 1   Objective Measure see eval   Treatment Interventions (PT)   Interventions Therapeutic Procedure/Exercise;Therapeutic Activity;Neuromuscular Re-education;Manual Therapy;Self Care/Home Management   Therapeutic Procedure/Exercise   Therapeutic Procedures: strength, endurance, ROM, flexibility minutes (30600) 20   Therapeutic Procedures Ther Proc 2;Ther Proc 3   Ther Proc 1 Upright bike   Ther Proc 1 - Details not today   Ther Proc 2 Filled out outcome measure   Ther Proc 2 - Details Discussed continuing HEP at home at least 3-4x/week for maintainence - pt in agreement   PTRx Ther Proc 1 Seated Knee Extension With Theraband   PTRx Ther Proc 1 - Details Verbal review   PTRx Ther Proc 2 Side Stepping With Theraband   PTRx Ther Proc 2 - Details 1x10/side RTB above knees   PTRx Ther Proc 3 Hip Flexion Straight Leg Raise   PTRx Ther Proc 3 - Details verbal review   PTRx Ther Proc 5 Bridging #1   PTRx Ther Proc 5 - Details verbal review   PTRx Ther Proc 6 Squat   PTRx Ther Proc 6 - Details verbal review   Skilled Intervention Modification of HEP for pt tolerance - see above   Patient Response/Progress Pt tolerated well with good form without increase in pain   Therapeutic Activity   Ther Act 1 Discussion on wearing  knee pads/using a kneeling pad to decrease pain with kneeling, as pt does not have significant pain while kneeling on plinthes   Ther Act 1 - Details Pt in agreement   Skilled Intervention edu - see above   Patient Response/Progress Pt understood   Manual Therapy   Manual Therapy 1 patellar stabilization taping   Manual Therapy 1 - Details k-tape applied to both legs, education provided on removing tape if skin becomes irritated, education on increasing awareness to difference in how symptoms feel with tape applied   Skilled Intervention see above   Patient Response/Progress verbalized understanding   Education   Learner/Method Patient   Plan   Home program PTRx - online   Total Session Time   Timed Code Treatment Minutes 20   Total Treatment Time (sum of timed and untimed services) 20       PLAN  Pt to continue for 1-2 more sessions 2x/month     Beginning/End Dates of Progress Note Reporting Period:  04/16/25 to 05/29/2025    Referring Provider:  Jeferson Hawk

## 2025-08-09 ENCOUNTER — HEALTH MAINTENANCE LETTER (OUTPATIENT)
Age: 30
End: 2025-08-09